# Patient Record
Sex: FEMALE | Race: WHITE | NOT HISPANIC OR LATINO | Employment: FULL TIME | URBAN - METROPOLITAN AREA
[De-identification: names, ages, dates, MRNs, and addresses within clinical notes are randomized per-mention and may not be internally consistent; named-entity substitution may affect disease eponyms.]

---

## 2022-01-31 ENCOUNTER — OFFICE VISIT (OUTPATIENT)
Dept: MEDICAL GROUP | Facility: PHYSICIAN GROUP | Age: 62
End: 2022-01-31
Payer: COMMERCIAL

## 2022-01-31 VITALS
RESPIRATION RATE: 16 BRPM | HEIGHT: 65 IN | WEIGHT: 161.3 LBS | HEART RATE: 65 BPM | BODY MASS INDEX: 26.87 KG/M2 | DIASTOLIC BLOOD PRESSURE: 76 MMHG | TEMPERATURE: 97.8 F | SYSTOLIC BLOOD PRESSURE: 124 MMHG | OXYGEN SATURATION: 96 %

## 2022-01-31 DIAGNOSIS — Z12.11 SPECIAL SCREENING FOR MALIGNANT NEOPLASM OF COLON: ICD-10-CM

## 2022-01-31 DIAGNOSIS — M79.644 CHRONIC THUMB PAIN, BILATERAL: ICD-10-CM

## 2022-01-31 DIAGNOSIS — G89.29 CHRONIC THUMB PAIN, BILATERAL: ICD-10-CM

## 2022-01-31 DIAGNOSIS — N81.4 UTERINE PROLAPSE: ICD-10-CM

## 2022-01-31 DIAGNOSIS — Z80.3 FAMILY HISTORY OF BREAST CANCER IN FIRST DEGREE RELATIVE: ICD-10-CM

## 2022-01-31 DIAGNOSIS — Z23 NEED FOR VACCINATION: ICD-10-CM

## 2022-01-31 DIAGNOSIS — Z98.84 STATUS POST BARIATRIC SURGERY: ICD-10-CM

## 2022-01-31 DIAGNOSIS — M79.645 CHRONIC THUMB PAIN, BILATERAL: ICD-10-CM

## 2022-01-31 PROCEDURE — 90472 IMMUNIZATION ADMIN EACH ADD: CPT | Performed by: NURSE PRACTITIONER

## 2022-01-31 PROCEDURE — 90471 IMMUNIZATION ADMIN: CPT | Performed by: NURSE PRACTITIONER

## 2022-01-31 PROCEDURE — 90732 PPSV23 VACC 2 YRS+ SUBQ/IM: CPT | Performed by: NURSE PRACTITIONER

## 2022-01-31 PROCEDURE — 99204 OFFICE O/P NEW MOD 45 MIN: CPT | Mod: 25 | Performed by: NURSE PRACTITIONER

## 2022-01-31 PROCEDURE — 90715 TDAP VACCINE 7 YRS/> IM: CPT | Performed by: NURSE PRACTITIONER

## 2022-01-31 ASSESSMENT — PATIENT HEALTH QUESTIONNAIRE - PHQ9: CLINICAL INTERPRETATION OF PHQ2 SCORE: 0

## 2022-01-31 NOTE — ASSESSMENT & PLAN NOTE
Chronic condition, stable.  Patient has done quite well after having bariatric surgery just over a year ago.  She has lost 90 pounds to date.  She does need follow-up labs in June 2022, which will put her approximately 18 months out from surgery at that time.  We will request records from her bariatric surgeon in [place], and she will return in 6 months with labs for follow-up.

## 2022-01-31 NOTE — ASSESSMENT & PLAN NOTE
Chronic condition, worsening.  Patient reports bilateral thumb pain right over the CMC joints.  She states that this has been present for some time, however over the past few months she has noticed that it is worsening.  We did discuss that she may have CMC joint arthritis, and referral to hand specialist for consideration of steroid injections may be beneficial to her.  Patient would like to do this.  Referral was placed to orthopedics, and patient will watch my chart for information on who she can call for appointment after approval.

## 2022-02-01 NOTE — ASSESSMENT & PLAN NOTE
Patient has history of breast cancer in her mother and grandmother.  She has been tested and is negative for the BRCA gene, however there is a variant of uncertain significance of MSH6 gene.  She also had a recent breast cyst on the left breast, which was biopsied and found to be negative for any cancer and positive for benign breast cyst.  Patient will be due for mammogram towards the end of this year.

## 2022-02-01 NOTE — ASSESSMENT & PLAN NOTE
Acute condition.  Patient reports that prior to her move from [place], she had consulted with her gynecologist who noted that she has a cystocele, rectocele and enterocele.  She does recommend surgical repair.  Patient would like to establish with gynecologic surgeon here for consultation.  She is having a difficult time at work, in which she does feel a lot of pain and pressure if she stands for too long without a 15 to 30-minute break to sit.  She does work for [employer], and I did tell her that if she wanted to provide me with reasonable work accommodation papers I would fill them out for her.  In the meanwhile, patient was advised that referral was placed, and to watch my chart for approval and where to call for appointment.

## 2022-02-01 NOTE — PROGRESS NOTES
Subjective:     CC: Diagnoses of Uterine prolapse, Chronic thumb pain, bilateral, Status post bariatric surgery, Family history of breast cancer in first degree relative, Special screening for malignant neoplasm of colon, and Need for vaccination were pertinent to this visit.      HPI:   Elaine is a new patient to me today wanting to establish care.  We discussed the following problems:    1. Uterine prolapse  Chronic condition, worsening.  Patient consulted for surgery in [place], would like referral to establish with surgeon in the [place] area.    2. Chronic thumb pain, bilateral  Chronic condition, worsening.  Patient here for evaluation today.    3. Status post bariatric surgery  Chronic condition, stable.  Patient due for updated labs in June 2022.    4. Family history of breast cancer in first degree relative  Family history of breast cancer, BRCA gene negative.    5. Special screening for malignant neoplasm of colon  Order placed for screening colonoscopy.    6. Need for vaccination  Pneumonia vaccine offered and given to patient today.       History reviewed. No pertinent past medical history.    Social History     Tobacco Use   • Smoking status: Never Smoker   • Smokeless tobacco: Never Used   Vaping Use   • Vaping Use: Never used   Substance Use Topics   • Alcohol use: Not Currently     Comment: Rare   • Drug use: Never       No current [facility]-ordered outpatient medications on file.     No current [facility]-ordered facility-administered medications on file.       Allergies:  Nsaids    Health Maintenance: Completed    ROS:  Gen: no fevers/chills, no changes in weight  Eyes: no changes in vision  ENT: no sore throat, no hearing loss, no bloody nose  Pulm: no sob, no cough  CV: no chest pain, no palpitations  GI: no nausea/vomiting, no diarrhea  : no dysuria  MSk: no myalgias  Skin: no rash  Neuro: no headaches, no numbness/tingling  Heme/Lymph: no easy bruising      Objective:       Exam:  /76   Pulse 65    "Temp 36.6 °C (97.8 °F)   Resp 16   Ht 1.65 m (5' 4.96\")   Wt 73.2 kg (161 lb 4.8 oz)   SpO2 96%   BMI 26.87 kg/m²  Body mass index is 26.87 kg/m².    Gen: Alert and oriented, No apparent distress.  Neck: Neck is supple without lymphadenopathy.  No carotid bruits.  Lungs: Normal effort, CTA bilaterally, no wheezes, rhonchi, or rales  CV: Regular rate and rhythm. No murmurs, rubs, or gallops.  Ext: No clubbing, cyanosis, edema.    Labs: None    Assessment & Plan:     71 y.o. female with the following -     Status post bariatric surgery  Chronic condition, stable.  Patient has done quite well after having bariatric surgery just over a year ago.  She has lost 90 pounds to date.  She does need follow-up labs in June 2022, which will put her approximately 18 months out from surgery at that time.  We will request records from her bariatric surgeon in [place], and she will return in 6 months with labs for follow-up.    Chronic thumb pain, bilateral  Chronic condition, worsening.  Patient reports bilateral thumb pain right over the CMC joints.  She states that this has been present for some time, however over the past few months she has noticed that it is worsening.  We did discuss that she may have CMC joint arthritis, and referral to hand specialist for consideration of steroid injections may be beneficial to her.  Patient would like to do this.  Referral was placed to orthopedics, and patient will watch my chart for information on who she can call for appointment after approval.    Uterine prolapse  Acute condition.  Patient reports that prior to her move from [place], she had consulted with her gynecologist who noted that she has a cystocele, rectocele and enterocele.  She does recommend surgical repair.  Patient would like to establish with gynecologic surgeon here for consultation.  She is having a difficult time at work, in which she does feel a lot of pain and pressure if she stands for too long without a 15 to " 30-minute break to sit.  She does work for [employer], and I did tell her that if she wanted to provide me with reasonable work accommodation papers I would fill them out for her.  In the meanwhile, patient was advised that referral was placed, and to watch my chart for approval and where to call for appointment.    Family history of breast cancer in first degree relative  Patient has history of breast cancer in her mother and grandmother.  She has been tested and is negative for the BRCA gene, however there is a variant of uncertain significance of MSH6 gene.  She also had a recent breast cyst on the left breast, which was biopsied and found to be negative for any cancer and positive for benign breast cyst.  Patient will be due for mammogram towards the end of this year.      Orders Placed This Encounter   • Tdap Vaccine =>6YO IM   • Pneumovax Vaccine (PPSV23)   • Referral to OB/Gyn Surgery   • Referral to Orthopedics   • Referral to GI for Colonoscopy          Return in about 6 months (around 7/31/2022).    I have placed the below orders and discussed them with an approved delegating provider.  The MA is performing the below orders under the direction of Eva Kovacs MD.    Please note that this dictation was created using voice recognition software. I have made every reasonable attempt to correct obvious errors, but I expect that there are errors of grammar and possibly content that I did not discover before finalizing the note.

## 2022-02-03 ENCOUNTER — PATIENT MESSAGE (OUTPATIENT)
Dept: MEDICAL GROUP | Facility: PHYSICIAN GROUP | Age: 62
End: 2022-02-03

## 2022-02-10 ENCOUNTER — PATIENT MESSAGE (OUTPATIENT)
Dept: MEDICAL GROUP | Facility: PHYSICIAN GROUP | Age: 62
End: 2022-02-10
Payer: COMMERCIAL

## 2022-02-10 ENCOUNTER — PATIENT MESSAGE (OUTPATIENT)
Dept: MEDICAL GROUP | Facility: PHYSICIAN GROUP | Age: 62
End: 2022-02-10

## 2022-03-01 ENCOUNTER — PATIENT MESSAGE (OUTPATIENT)
Dept: MEDICAL GROUP | Facility: PHYSICIAN GROUP | Age: 62
End: 2022-03-01
Payer: COMMERCIAL

## 2022-03-01 ENCOUNTER — TELEPHONE (OUTPATIENT)
Dept: MEDICAL GROUP | Facility: PHYSICIAN GROUP | Age: 62
End: 2022-03-01
Payer: COMMERCIAL

## 2022-03-01 NOTE — LETTER
[facility]  ROHIT Yousif  [address]  [facility] [address]  Fax: [phone]   Authorization for Release/Disclosure of   Protected Health Information   Name: ZOE ELIAS : 1960 SSN: [id number]   Address: [facility] [address]  [facility] [address] Phone:    [phone] (home)    I authorize the entity listed below to release/disclose the PHI below to:   [facility]/ROHIT Yousif and ROHIT Yousif   Provider or Entity Name:  NICOLE BARNES M.D.      Address   City, State, Zip   Phone:      Fax:  [phone]        Reason for request: continuity of care   Information to be released:    [  ] LAST COLONOSCOPY,  including any PATH REPORT and follow-up  [  ] LAST FIT/COLOGUARD RESULT [  ] LAST DEXA  [  ] LAST MAMMOGRAM  [  ] LAST PAP  [  ] LAST LABS [  ] RETINA EXAM REPORT  [  ] IMMUNIZATION RECORDS  [  ] Release all info      [  ] Check here and initial the line next to each item to release ALL health information INCLUDING  _____ Care and treatment for drug and / or alcohol abuse  _____ HIV testing, infection status, or AIDS  _____ Genetic Testing    DATES OF SERVICE OR TIME PERIOD TO BE DISCLOSED: _____________  I understand and acknowledge that:  * This Authorization may be revoked at any time by you in writing, except if your health information has already been used or disclosed.  * Your health information that will be used or disclosed as a result of you signing this authorization could be re-disclosed by the recipient. If this occurs, your re-disclosed health information may no longer be protected by State or Federal laws.  * You may refuse to sign this Authorization. Your refusal will not affect your ability to obtain treatment.  * This Authorization becomes effective upon signing and will  on (date) __________.      If no date is indicated, this Authorization will  one (1) year from the signature date.    Name: Zoe Elias    Signature:Continuity of  care   Date:     3/2/2022       PLEASE FAX REQUESTED RECORDS BACK TO: [phone]

## 2022-03-02 NOTE — TELEPHONE ENCOUNTER
Referral ordered, faxed[facility]

## 2022-06-22 ENCOUNTER — OFFICE VISIT (OUTPATIENT)
Dept: MEDICAL GROUP | Facility: PHYSICIAN GROUP | Age: 62
End: 2022-06-22
Payer: COMMERCIAL

## 2022-06-22 VITALS
TEMPERATURE: 97.6 F | OXYGEN SATURATION: 95 % | HEART RATE: 87 BPM | WEIGHT: 160.6 LBS | SYSTOLIC BLOOD PRESSURE: 102 MMHG | BODY MASS INDEX: 26.76 KG/M2 | HEIGHT: 65 IN | DIASTOLIC BLOOD PRESSURE: 60 MMHG | RESPIRATION RATE: 16 BRPM

## 2022-06-22 DIAGNOSIS — G89.29 CHRONIC THUMB PAIN, BILATERAL: Primary | ICD-10-CM

## 2022-06-22 DIAGNOSIS — N81.4 UTERINE PROLAPSE: ICD-10-CM

## 2022-06-22 DIAGNOSIS — M79.645 CHRONIC THUMB PAIN, BILATERAL: Primary | ICD-10-CM

## 2022-06-22 DIAGNOSIS — M79.644 CHRONIC THUMB PAIN, BILATERAL: Primary | ICD-10-CM

## 2022-06-22 DIAGNOSIS — Z98.84 STATUS POST BARIATRIC SURGERY: ICD-10-CM

## 2022-06-22 PROCEDURE — 99214 OFFICE O/P EST MOD 30 MIN: CPT | Performed by: NURSE PRACTITIONER

## 2022-06-22 SDOH — ECONOMIC STABILITY: TRANSPORTATION INSECURITY
IN THE PAST 12 MONTHS, HAS LACK OF RELIABLE TRANSPORTATION KEPT YOU FROM MEDICAL APPOINTMENTS, MEETINGS, WORK OR FROM GETTING THINGS NEEDED FOR DAILY LIVING?: NO

## 2022-06-22 SDOH — HEALTH STABILITY: PHYSICAL HEALTH: ON AVERAGE, HOW MANY MINUTES DO YOU ENGAGE IN EXERCISE AT THIS LEVEL?: 60 MIN

## 2022-06-22 SDOH — ECONOMIC STABILITY: HOUSING INSECURITY: IN THE LAST 12 MONTHS, HOW MANY PLACES HAVE YOU LIVED?: 1

## 2022-06-22 SDOH — ECONOMIC STABILITY: FOOD INSECURITY: WITHIN THE PAST 12 MONTHS, YOU WORRIED THAT YOUR FOOD WOULD RUN OUT BEFORE YOU GOT MONEY TO BUY MORE.: NEVER TRUE

## 2022-06-22 SDOH — ECONOMIC STABILITY: FOOD INSECURITY: WITHIN THE PAST 12 MONTHS, THE FOOD YOU BOUGHT JUST DIDN'T LAST AND YOU DIDN'T HAVE MONEY TO GET MORE.: NEVER TRUE

## 2022-06-22 SDOH — ECONOMIC STABILITY: TRANSPORTATION INSECURITY
IN THE PAST 12 MONTHS, HAS LACK OF TRANSPORTATION KEPT YOU FROM MEETINGS, WORK, OR FROM GETTING THINGS NEEDED FOR DAILY LIVING?: NO

## 2022-06-22 SDOH — ECONOMIC STABILITY: HOUSING INSECURITY
IN THE LAST 12 MONTHS, WAS THERE A TIME WHEN YOU DID NOT HAVE A STEADY PLACE TO SLEEP OR SLEPT IN A SHELTER (INCLUDING NOW)?: NO

## 2022-06-22 SDOH — HEALTH STABILITY: PHYSICAL HEALTH: ON AVERAGE, HOW MANY DAYS PER WEEK DO YOU ENGAGE IN MODERATE TO STRENUOUS EXERCISE (LIKE A BRISK WALK)?: 5 DAYS

## 2022-06-22 SDOH — HEALTH STABILITY: MENTAL HEALTH
STRESS IS WHEN SOMEONE FEELS TENSE, NERVOUS, ANXIOUS, OR CAN'T SLEEP AT NIGHT BECAUSE THEIR MIND IS TROUBLED. HOW STRESSED ARE YOU?: NOT AT ALL

## 2022-06-22 SDOH — ECONOMIC STABILITY: INCOME INSECURITY: HOW HARD IS IT FOR YOU TO PAY FOR THE VERY BASICS LIKE FOOD, HOUSING, MEDICAL CARE, AND HEATING?: NOT VERY HARD

## 2022-06-22 SDOH — ECONOMIC STABILITY: INCOME INSECURITY: IN THE LAST 12 MONTHS, WAS THERE A TIME WHEN YOU WERE NOT ABLE TO PAY THE MORTGAGE OR RENT ON TIME?: NO

## 2022-06-22 SDOH — ECONOMIC STABILITY: TRANSPORTATION INSECURITY
IN THE PAST 12 MONTHS, HAS THE LACK OF TRANSPORTATION KEPT YOU FROM MEDICAL APPOINTMENTS OR FROM GETTING MEDICATIONS?: NO

## 2022-06-22 ASSESSMENT — SOCIAL DETERMINANTS OF HEALTH (SDOH)
HOW OFTEN DO YOU HAVE SIX OR MORE DRINKS ON ONE OCCASION: NEVER
WITHIN THE PAST 12 MONTHS, YOU WORRIED THAT YOUR FOOD WOULD RUN OUT BEFORE YOU GOT THE MONEY TO BUY MORE: NEVER TRUE
HOW OFTEN DO YOU HAVE A DRINK CONTAINING ALCOHOL: NEVER
HOW OFTEN DO YOU ATTENT MEETINGS OF THE CLUB OR ORGANIZATION YOU BELONG TO?: NEVER
HOW OFTEN DO YOU ATTEND CHURCH OR RELIGIOUS SERVICES?: 1 TO 4 TIMES PER YEAR
IN A TYPICAL WEEK, HOW MANY TIMES DO YOU TALK ON THE PHONE WITH FAMILY, FRIENDS, OR NEIGHBORS?: ONCE A WEEK
HOW MANY DRINKS CONTAINING ALCOHOL DO YOU HAVE ON A TYPICAL DAY WHEN YOU ARE DRINKING: PATIENT DOES NOT DRINK
DO YOU BELONG TO ANY CLUBS OR ORGANIZATIONS SUCH AS CHURCH GROUPS UNIONS, FRATERNAL OR ATHLETIC GROUPS, OR SCHOOL GROUPS?: NO
IN A TYPICAL WEEK, HOW MANY TIMES DO YOU TALK ON THE PHONE WITH FAMILY, FRIENDS, OR NEIGHBORS?: ONCE A WEEK
HOW OFTEN DO YOU GET TOGETHER WITH FRIENDS OR RELATIVES?: ONCE A WEEK
DO YOU BELONG TO ANY CLUBS OR ORGANIZATIONS SUCH AS CHURCH GROUPS UNIONS, FRATERNAL OR ATHLETIC GROUPS, OR SCHOOL GROUPS?: NO
HOW OFTEN DO YOU GET TOGETHER WITH FRIENDS OR RELATIVES?: ONCE A WEEK
HOW OFTEN DO YOU ATTENT MEETINGS OF THE CLUB OR ORGANIZATION YOU BELONG TO?: NEVER
HOW HARD IS IT FOR YOU TO PAY FOR THE VERY BASICS LIKE FOOD, HOUSING, MEDICAL CARE, AND HEATING?: NOT VERY HARD
HOW OFTEN DO YOU ATTEND CHURCH OR RELIGIOUS SERVICES?: 1 TO 4 TIMES PER YEAR

## 2022-06-22 ASSESSMENT — LIFESTYLE VARIABLES
SKIP TO QUESTIONS 9-10: 1
HOW OFTEN DO YOU HAVE SIX OR MORE DRINKS ON ONE OCCASION: NEVER
AUDIT-C TOTAL SCORE: 0
HOW MANY STANDARD DRINKS CONTAINING ALCOHOL DO YOU HAVE ON A TYPICAL DAY: PATIENT DOES NOT DRINK
HOW OFTEN DO YOU HAVE A DRINK CONTAINING ALCOHOL: NEVER

## 2022-06-22 NOTE — ASSESSMENT & PLAN NOTE
Chronic and ongoing. Patient reports improvement in her thumb pain.  She bought wrist braces and has been wearing them for about one and a half weeks which has really made a difference.  She also did a little bit of therapy with a therapist that is provided at work.  She states improvement with this also.  She will continue with her conservative management and let me know if she needs anything further.

## 2022-06-22 NOTE — ASSESSMENT & PLAN NOTE
Chronic and ongoing.  Patient has consulted with GYN and cannot take time off work for surgical repair until she has been at her job for one year, which will be January 28, 2023.  She is scheduled for a pessary on 7/5.  She is doing okay, and is able to work full-time with the work accomodations given to her. Those are in place until 2/2023.  Patient will let me know if she needs any additional accomodation paperwork while she waits for a surgical date.

## 2022-06-23 NOTE — ASSESSMENT & PLAN NOTE
Chronic condition, stable.  Patient is s/p bariatric surgery. She is doing well.  Weight is stable at 160.  She does need her annual lab follow up for this. Orders were placed today.

## 2022-06-23 NOTE — PROGRESS NOTES
"Subjective:     CC: The primary encounter diagnosis was Chronic thumb pain, bilateral. Diagnoses of Uterine prolapse and Status post bariatric surgery were also pertinent to this visit.      HPI:   Elaine is an established patient of [facility] here for follow-up.  We discussed the following problems:    1. Chronic thumb pain, bilateral  Chronic condition, stable. Patient is here for evaluation today.    2. Uterine prolapse  Chronic condition, stable. Patient is here for evaluation today.    3. Status post bariatric surgery  Chronic condition, stable. Patient is here for evaluation today.     History reviewed. No pertinent past medical history.    Social History     Tobacco Use   • Smoking status: Never Smoker   • Smokeless tobacco: Never Used   Vaping Use   • Vaping Use: Never used   Substance Use Topics   • Alcohol use: Not Currently     Comment: Rare   • Drug use: Never       No current Epic-ordered outpatient medications on file.     No current [facility]-ordered facility-administered medications on file.       Allergies:  Nsaids    Health Maintenance: Completed    ROS:  Gen: no fevers/chills, no changes in weight  Eyes: no changes in vision  ENT: no sore throat, no hearing loss, no bloody nose  Pulm: no sob, no cough  CV: no chest pain, no palpitations  GI: no nausea/vomiting, no diarrhea  : no dysuria  MSk: no myalgias  Skin: no rash  Neuro: no headaches, no numbness/tingling  Heme/Lymph: no easy bruising      Objective:       Exam:  /60 (BP Location: Right arm, Patient Position: Sitting, BP Cuff Size: Adult)   Pulse 87   Temp 36.4 °C (97.6 °F) (Temporal)   Resp 16   Ht 1.65 m (5' 4.96\")   Wt 72.8 kg (160 lb 9.6 oz)   SpO2 95%   BMI 26.76 kg/m²  Body mass index is 26.76 kg/m².    Gen: Alert and oriented, No apparent distress.  Neck: Neck is supple without lymphadenopathy.  No carotid bruits.  Lungs: Normal effort, CTA bilaterally, no wheezes, rhonchi, or rales  CV: Regular rate and rhythm. No murmurs, rubs, " or gallops.  Ext: No clubbing, cyanosis, edema.    Labs: Dated: None    Assessment & Plan:     62 y.o. female with the following -     Chronic thumb pain, bilateral  Chronic and ongoing. Patient reports improvement in her thumb pain.  She bought wrist braces and has been wearing them for about one and a half weeks which has really made a difference.  She also did a little bit of therapy with a therapist that is provided at work.  She states improvement with this also.  She will continue with her conservative management and let me know if she needs anything further.     Uterine prolapse  Chronic and ongoing.  Patient has consulted with GYN and cannot take time off work for surgical repair until she has been at her job for one year, which will be January 28, 2023.  She is scheduled for a pessary on 7/5.  She is doing okay, and is able to work full-time with the work accomodations given to her. Those are in place until 2/2023.  Patient will let me know if she needs any additional accomodation paperwork while she waits for a surgical date.     Status post bariatric surgery  Chronic condition, stable.  Patient is s/p bariatric surgery. She is doing well.  Weight is stable at 160.  She does need her annual lab follow up for this. Orders were placed today.      Orders Placed This Encounter   • CBC WITH DIFFERENTIAL   • Comp Metabolic Panel   • COPPER, SERUM   • FERRITIN   • FOLATE   • IRON/TOTAL IRON BIND   • VITAMIN A   • ZINC SERUM   • VITAMIN K   • VITAMIN E   • VITAMIN D,25 HYDROXY   • VITAMIN B12   • VITAMIN B1   • Lipid Profile          Return in about 1 year (around 6/22/2023), or if symptoms worsen or fail to improve.    I have placed the below orders and discussed them with an approved delegating provider.  The MA is performing the below orders under the direction of Eva Kovacs MD.    Please note that this dictation was created using voice recognition software. I have made every reasonable attempt to correct  obvious errors, but I expect that there are errors of grammar and possibly content that I did not discover before finalizing the note.

## 2022-07-05 ENCOUNTER — HOSPITAL ENCOUNTER (OUTPATIENT)
Dept: LAB | Facility: MEDICAL CENTER | Age: 62
End: 2022-07-05
Attending: NURSE PRACTITIONER
Payer: COMMERCIAL

## 2022-07-05 DIAGNOSIS — Z98.84 STATUS POST BARIATRIC SURGERY: ICD-10-CM

## 2022-07-05 LAB
25(OH)D3 SERPL-MCNC: 38 NG/ML (ref 30–100)
ALBUMIN SERPL BCP-MCNC: 3.8 G/DL (ref 3.2–4.9)
ALBUMIN/GLOB SERPL: 1.8 G/DL
ALP SERPL-CCNC: 55 U/L (ref 30–99)
ALT SERPL-CCNC: 24 U/L (ref 2–50)
ANION GAP SERPL CALC-SCNC: 8 MMOL/L (ref 7–16)
AST SERPL-CCNC: 26 U/L (ref 12–45)
BASOPHILS # BLD AUTO: 0.5 % (ref 0–1.8)
BASOPHILS # BLD: 0.02 K/UL (ref 0–0.12)
BILIRUB SERPL-MCNC: 0.5 MG/DL (ref 0.1–1.5)
BUN SERPL-MCNC: 16 MG/DL (ref 8–22)
CALCIUM SERPL-MCNC: 8.7 MG/DL (ref 8.5–10.5)
CHLORIDE SERPL-SCNC: 107 MMOL/L (ref 96–112)
CHOLEST SERPL-MCNC: 224 MG/DL (ref 100–199)
CO2 SERPL-SCNC: 26 MMOL/L (ref 20–33)
CREAT SERPL-MCNC: 0.72 MG/DL (ref 0.5–1.4)
EOSINOPHIL # BLD AUTO: 0.08 K/UL (ref 0–0.51)
EOSINOPHIL NFR BLD: 1.9 % (ref 0–6.9)
ERYTHROCYTE [DISTWIDTH] IN BLOOD BY AUTOMATED COUNT: 42.8 FL (ref 35.9–50)
FASTING STATUS PATIENT QL REPORTED: NORMAL
FERRITIN SERPL-MCNC: 143 NG/ML (ref 10–291)
FOLATE SERPL-MCNC: 23.6 NG/ML
GFR SERPLBLD CREATININE-BSD FMLA CKD-EPI: 96 ML/MIN/1.73 M 2
GLOBULIN SER CALC-MCNC: 2.1 G/DL (ref 1.9–3.5)
GLUCOSE SERPL-MCNC: 85 MG/DL (ref 65–99)
HCT VFR BLD AUTO: 44.9 % (ref 37–47)
HDLC SERPL-MCNC: 60 MG/DL
HGB BLD-MCNC: 15.2 G/DL (ref 12–16)
IMM GRANULOCYTES # BLD AUTO: 0.01 K/UL (ref 0–0.11)
IMM GRANULOCYTES NFR BLD AUTO: 0.2 % (ref 0–0.9)
IRON SATN MFR SERPL: 40 % (ref 15–55)
IRON SERPL-MCNC: 104 UG/DL (ref 40–170)
LDLC SERPL CALC-MCNC: 149 MG/DL
LYMPHOCYTES # BLD AUTO: 1.34 K/UL (ref 1–4.8)
LYMPHOCYTES NFR BLD: 32.5 % (ref 22–41)
MCH RBC QN AUTO: 29.4 PG (ref 27–33)
MCHC RBC AUTO-ENTMCNC: 33.9 G/DL (ref 33.6–35)
MCV RBC AUTO: 86.8 FL (ref 81.4–97.8)
MONOCYTES # BLD AUTO: 0.26 K/UL (ref 0–0.85)
MONOCYTES NFR BLD AUTO: 6.3 % (ref 0–13.4)
NEUTROPHILS # BLD AUTO: 2.41 K/UL (ref 2–7.15)
NEUTROPHILS NFR BLD: 58.6 % (ref 44–72)
NRBC # BLD AUTO: 0 K/UL
NRBC BLD-RTO: 0 /100 WBC
PLATELET # BLD AUTO: 203 K/UL (ref 164–446)
PMV BLD AUTO: 9.8 FL (ref 9–12.9)
POTASSIUM SERPL-SCNC: 4.2 MMOL/L (ref 3.6–5.5)
PROT SERPL-MCNC: 5.9 G/DL (ref 6–8.2)
RBC # BLD AUTO: 5.17 M/UL (ref 4.2–5.4)
SODIUM SERPL-SCNC: 141 MMOL/L (ref 135–145)
TIBC SERPL-MCNC: 258 UG/DL (ref 250–450)
TRIGL SERPL-MCNC: 77 MG/DL (ref 0–149)
UIBC SERPL-MCNC: 154 UG/DL (ref 110–370)
VIT B12 SERPL-MCNC: 2670 PG/ML (ref 211–911)
WBC # BLD AUTO: 4.1 K/UL (ref 4.8–10.8)

## 2022-07-05 PROCEDURE — 85025 COMPLETE CBC W/AUTO DIFF WBC: CPT

## 2022-07-05 PROCEDURE — 84630 ASSAY OF ZINC: CPT

## 2022-07-05 PROCEDURE — 84590 ASSAY OF VITAMIN A: CPT

## 2022-07-05 PROCEDURE — 80053 COMPREHEN METABOLIC PANEL: CPT

## 2022-07-05 PROCEDURE — 82728 ASSAY OF FERRITIN: CPT

## 2022-07-05 PROCEDURE — 83550 IRON BINDING TEST: CPT

## 2022-07-05 PROCEDURE — 82746 ASSAY OF FOLIC ACID SERUM: CPT

## 2022-07-05 PROCEDURE — 82607 VITAMIN B-12: CPT

## 2022-07-05 PROCEDURE — 36415 COLL VENOUS BLD VENIPUNCTURE: CPT

## 2022-07-05 PROCEDURE — 80061 LIPID PANEL: CPT

## 2022-07-05 PROCEDURE — 84425 ASSAY OF VITAMIN B-1: CPT

## 2022-07-05 PROCEDURE — 84597 ASSAY OF VITAMIN K: CPT

## 2022-07-05 PROCEDURE — 83540 ASSAY OF IRON: CPT

## 2022-07-05 PROCEDURE — 84446 ASSAY OF VITAMIN E: CPT

## 2022-07-05 PROCEDURE — 82525 ASSAY OF COPPER: CPT

## 2022-07-05 PROCEDURE — 82306 VITAMIN D 25 HYDROXY: CPT

## 2022-07-07 LAB — VIT B1 BLD-MCNC: 153 NMOL/L (ref 70–180)

## 2022-07-08 LAB
A-TOCOPHEROL VIT E SERPL-MCNC: 15.1 MG/L (ref 5.5–18)
ANNOTATION COMMENT IMP: NORMAL
BETA+GAMMA TOCOPHEROL SERPL-MCNC: 0.9 MG/L (ref 0–6)
COPPER SERPL-MCNC: 97.9 UG/DL (ref 80–155)
RETINYL PALMITATE SERPL-MCNC: 0.03 MG/L (ref 0–0.1)
VIT A SERPL-MCNC: 1.01 MG/L (ref 0.3–1.2)
ZINC SERPL-MCNC: 83.4 UG/DL (ref 60–120)

## 2022-07-09 LAB — PHYTONADIONE SERPL-MCNC: 2.19 NMOL/L (ref 0.22–4.88)

## 2022-09-26 ENCOUNTER — HOSPITAL ENCOUNTER (OUTPATIENT)
Dept: RADIOLOGY | Facility: MEDICAL CENTER | Age: 62
End: 2022-09-26
Attending: NURSE PRACTITIONER
Payer: COMMERCIAL

## 2022-09-26 DIAGNOSIS — Z12.31 VISIT FOR SCREENING MAMMOGRAM: ICD-10-CM

## 2022-09-26 PROCEDURE — 77063 BREAST TOMOSYNTHESIS BI: CPT

## 2022-09-30 ENCOUNTER — HOSPITAL ENCOUNTER (OUTPATIENT)
Dept: RADIOLOGY | Facility: MEDICAL CENTER | Age: 62
End: 2022-09-30
Payer: COMMERCIAL

## 2022-12-15 ENCOUNTER — OFFICE VISIT (OUTPATIENT)
Dept: URGENT CARE | Facility: CLINIC | Age: 62
End: 2022-12-15
Payer: COMMERCIAL

## 2022-12-15 VITALS
TEMPERATURE: 97.5 F | OXYGEN SATURATION: 99 % | WEIGHT: 170 LBS | HEIGHT: 65 IN | SYSTOLIC BLOOD PRESSURE: 128 MMHG | DIASTOLIC BLOOD PRESSURE: 78 MMHG | HEART RATE: 79 BPM | BODY MASS INDEX: 28.32 KG/M2 | RESPIRATION RATE: 18 BRPM

## 2022-12-15 DIAGNOSIS — J06.9 URI WITH COUGH AND CONGESTION: ICD-10-CM

## 2022-12-15 LAB
EXTERNAL QUALITY CONTROL: NORMAL
FLUAV+FLUBV AG SPEC QL IA: NEGATIVE
INT CON NEG: NEGATIVE
INT CON NEG: NEGATIVE
INT CON POS: POSITIVE
INT CON POS: POSITIVE
SARS-COV+SARS-COV-2 AG RESP QL IA.RAPID: NEGATIVE

## 2022-12-15 PROCEDURE — 87426 SARSCOV CORONAVIRUS AG IA: CPT

## 2022-12-15 PROCEDURE — 99213 OFFICE O/P EST LOW 20 MIN: CPT

## 2022-12-15 PROCEDURE — 87804 INFLUENZA ASSAY W/OPTIC: CPT

## 2022-12-15 ASSESSMENT — FIBROSIS 4 INDEX: FIB4 SCORE: 1.57

## 2022-12-15 ASSESSMENT — ENCOUNTER SYMPTOMS
WEIGHT LOSS: 0
CHILLS: 0
WHEEZING: 0
SORE THROAT: 0
FEVER: 0
DIAPHORESIS: 0
SPUTUM PRODUCTION: 1
HEMOPTYSIS: 0
COUGH: 1
SHORTNESS OF BREATH: 0
SINUS PAIN: 0

## 2022-12-15 NOTE — LETTER
December 15, 2022    To Whom It May Concern:         This is confirmation that Elaine Darren attended her scheduled appointment with ROHIT Ndiaye on 12/15/22. Please excuse her from work 12/15/22-12/16/22.         If you have any questions please do not hesitate to call me at the phone number listed below.        Sincerely,        ROHIT Ndiaye   Electronically signed  [phone]

## 2022-12-16 NOTE — PROGRESS NOTES
Subjective:   Elaine Banks is a 62 y.o. female who presents for Cough (X3days Chest congestion/green/white phlegm/)      HPI: This is a 8-year-old female presents today for cough and congestion.  This new problem.  Patient reports cough and congestion x3 days.  She reports cough is intermittently productive.  She has taken Mucinex for this.  She recently has had sick contacts.  She denies fevers and chills.  She has had mild body aches.  She denies shortness of breath or wheezing.  No smoking history, asthma, COPD.  She is fully vaccinated for COVID and has recently had influenza vaccination.      Review of Systems   Constitutional:  Positive for malaise/fatigue. Negative for chills, diaphoresis, fever and weight loss.   HENT:  Positive for congestion. Negative for ear pain, sinus pain and sore throat.    Respiratory:  Positive for cough and sputum production. Negative for hemoptysis, shortness of breath and wheezing.      Medications:    No current outpatient medications on file prior to visit.     No current facility-administered medications on file prior to visit.        Allergies:   Nsaids    Problem List:   Patient Active Problem List   Diagnosis    Status post bariatric surgery    Uterine prolapse    Chronic thumb pain, bilateral    Family history of breast cancer in first degree relative        Surgical History:  Past Surgical History:   Procedure Laterality Date    OTHER ABDOMINAL SURGERY  12/2020    gastric sleeve surgery    BUNIONECTOMY Right 2018    LIPOMA EXCISION Left 2016    ABDOMINAL EXPLORATION         Past Social Hx:   Social History     Tobacco Use    Smoking status: Never    Smokeless tobacco: Never   Vaping Use    Vaping Use: Never used   Substance Use Topics    Alcohol use: Not Currently     Comment: Rare    Drug use: Never          Problem list, medications, and allergies reviewed by myself today in Epic.     Objective:     /78 (BP Location: Right arm, Patient Position: Sitting)    "Pulse 79   Temp 36.4 °C (97.5 °F) (Temporal)   Resp 18   Ht 1.651 m (5' 5\")   Wt 77.1 kg (170 lb)   SpO2 99%   BMI 28.29 kg/m²     Physical Exam  Vitals and nursing note reviewed.   Constitutional:       General: She is not in acute distress.     Appearance: Normal appearance. She is normal weight. She is not ill-appearing, toxic-appearing or diaphoretic.   HENT:      Head: Normocephalic and atraumatic.      Right Ear: Tympanic membrane, ear canal and external ear normal. There is no impacted cerumen.      Left Ear: Tympanic membrane, ear canal and external ear normal. There is no impacted cerumen.      Nose: Nose normal. No congestion or rhinorrhea.      Mouth/Throat:      Mouth: Mucous membranes are moist.      Pharynx: Oropharynx is clear. No oropharyngeal exudate or posterior oropharyngeal erythema.   Cardiovascular:      Rate and Rhythm: Normal rate and regular rhythm.      Pulses: Normal pulses.      Heart sounds: Normal heart sounds. No murmur heard.    No friction rub. No gallop.   Pulmonary:      Effort: Pulmonary effort is normal. No respiratory distress.      Breath sounds: Normal breath sounds. No stridor. No wheezing, rhonchi or rales.      Comments: +cough    Chest:      Chest wall: No tenderness.   Musculoskeletal:      Cervical back: Neck supple. No tenderness.   Lymphadenopathy:      Cervical: No cervical adenopathy.   Skin:     General: Skin is warm and dry.      Capillary Refill: Capillary refill takes less than 2 seconds.   Neurological:      General: No focal deficit present.      Mental Status: She is alert and oriented to person, place, and time. Mental status is at baseline.      Cranial Nerves: No cranial nerve deficit.      Motor: No weakness.      Gait: Gait normal.   Psychiatric:         Mood and Affect: Mood normal.         Behavior: Behavior normal.         Thought Content: Thought content normal.         Judgment: Judgment normal.       Assessment/Plan:     Diagnosis and " associated orders:   1. URI with cough and congestion  POCT Influenza A/B    POCT SARS-COV Antigen [facility] Manual Result          Comments/MDM:   Pt is clinically stable at today's acute urgent care visit.  No acute distress noted. Appropriate for outpatient management at this time.     Acute problem.  Patient is not ill or toxic appearing clinic today.  Vital signs are stable, SPO2 99% on room air, lung sounds clear to auscultation, nonlabored breathing.  Rapid influenza and COVID both negative in clinic today.I have discussed with patient that symptoms are viral in etiology. I have recommended supportive care to include; Increased fluids, rest, over-the-counter cold and flu medications, alternating Tylenol and/or ibuprofen per manufactures instructions for symptomatic relief and fevers, and the use of a humidifier. Patient is to return to UC or go to ER for any new or worsening s/s, and follow up with PCP for recheck. Patient is agreeable with plan of care and verbalized good understanding.   Work note provided.         Discussed DDx, management options (risks,benefits, and alternatives to planned treatment), natural progression and supportive care.  Expressed understanding and the treatment plan was agreed upon. Questions were encouraged and answered   Return to urgent care prn if new or worsening sx or if there is no improvement in condition prn.    Educated in Red flags and indications to immediately call 911 or present to the Emergency Department.   Advised the patient to follow-up with the primary care physician for recheck, reevaluation, and consideration of further management.    I personally reviewed prior external notes and test results pertinent to today's visit.  I have independently reviewed and interpreted all diagnostics ordered during this urgent care acute visit.       Please note that this dictation was created using voice recognition software. I have made a reasonable attempt to correct obvious  errors, but I expect that there are errors of grammar and possibly content that I did not discover before finalizing the note.    This note was electronically signed by FAHAD Bazan

## 2022-12-20 ENCOUNTER — OFFICE VISIT (OUTPATIENT)
Dept: URGENT CARE | Facility: CLINIC | Age: 62
End: 2022-12-20
Payer: COMMERCIAL

## 2022-12-20 VITALS
TEMPERATURE: 99.2 F | OXYGEN SATURATION: 97 % | WEIGHT: 170 LBS | RESPIRATION RATE: 20 BRPM | SYSTOLIC BLOOD PRESSURE: 122 MMHG | BODY MASS INDEX: 28.32 KG/M2 | DIASTOLIC BLOOD PRESSURE: 78 MMHG | HEART RATE: 70 BPM | HEIGHT: 65 IN

## 2022-12-20 DIAGNOSIS — J32.9 SINUSITIS, UNSPECIFIED CHRONICITY, UNSPECIFIED LOCATION: ICD-10-CM

## 2022-12-20 DIAGNOSIS — J34.89 SINUS PRESSURE: ICD-10-CM

## 2022-12-20 PROCEDURE — 99213 OFFICE O/P EST LOW 20 MIN: CPT | Performed by: FAMILY MEDICINE

## 2022-12-20 RX ORDER — ACETAMINOPHEN 500 MG
500-1000 TABLET ORAL EVERY 6 HOURS PRN
COMMUNITY

## 2022-12-20 RX ORDER — AMOXICILLIN AND CLAVULANATE POTASSIUM 875; 125 MG/1; MG/1
1 TABLET, FILM COATED ORAL 2 TIMES DAILY
Qty: 14 TABLET | Refills: 0 | Status: SHIPPED | OUTPATIENT
Start: 2022-12-20 | End: 2022-12-27

## 2022-12-20 ASSESSMENT — ENCOUNTER SYMPTOMS
SORE THROAT: 0
FEVER: 1
COUGH: 0
VOMITING: 0
SHORTNESS OF BREATH: 0
SINUS PAIN: 1
NAUSEA: 0
CHILLS: 0
DIZZINESS: 0
MYALGIAS: 1

## 2022-12-20 ASSESSMENT — FIBROSIS 4 INDEX: FIB4 SCORE: 1.57

## 2022-12-20 NOTE — PATIENT INSTRUCTIONS
Sinusitis, Adult  Sinusitis is soreness and swelling (inflammation) of your sinuses. Sinuses are hollow spaces in the bones around your face. They are located:  Around your eyes.  In the middle of your forehead.  Behind your nose.  In your cheekbones.  Your sinuses and nasal passages are lined with a fluid called mucus. Mucus drains out of your sinuses. Swelling can trap mucus in your sinuses. This lets germs (bacteria, virus, or fungus) grow, which leads to infection. Most of the time, this condition is caused by a virus.  What are the causes?  This condition is caused by:  Allergies.  Asthma.  Germs.  Things that block your nose or sinuses.  Growths in the nose (nasal polyps).  Chemicals or irritants in the air.  Fungus (rare).  What increases the risk?  You are more likely to develop this condition if:  You have a weak body defense system (immune system).  You do a lot of swimming or diving.  You use nasal sprays too much.  You smoke.  What are the signs or symptoms?  The main symptoms of this condition are pain and a feeling of pressure around the sinuses. Other symptoms include:  Stuffy nose (congestion).  Runny nose (drainage).  Swelling and warmth in the sinuses.  Headache.  Toothache.  A cough that may get worse at night.  Mucus that collects in the throat or the back of the nose (postnasal drip).  Being unable to smell and taste.  Being very tired (fatigue).  A fever.  Sore throat.  Bad breath.  How is this diagnosed?  This condition is diagnosed based on:  Your symptoms.  Your medical history.  A physical exam.  Tests to find out if your condition is short-term (acute) or long-term (chronic). Your doctor may:  Check your nose for growths (polyps).  Check your sinuses using a tool that has a light (endoscope).  Check for allergies or germs.  Do imaging tests, such as an MRI or CT scan.  How is this treated?  Treatment for this condition depends on the cause and whether it is short-term or long-term.  If  caused by a virus, your symptoms should go away on their own within 10 days. You may be given medicines to relieve symptoms. They include:  Medicines that shrink swollen tissue in the nose.  Medicines that treat allergies (antihistamines).  A spray that treats swelling of the nostrils.   Rinses that help get rid of thick mucus in your nose (nasal saline washes).  If caused by bacteria, your doctor may wait to see if you will get better without treatment. You may be given antibiotic medicine if you have:  A very bad infection.  A weak body defense system.  If caused by growths in the nose, you may need to have surgery.  Follow these instructions at home:  Medicines  Take, use, or apply over-the-counter and prescription medicines only as told by your doctor. These may include nasal sprays.  If you were prescribed an antibiotic medicine, take it as told by your doctor. Do not stop taking the antibiotic even if you start to feel better.  Hydrate and humidify    Drink enough water to keep your pee (urine) pale yellow.  Use a cool mist humidifier to keep the humidity level in your home above 50%.  Breathe in steam for 10-15 minutes, 3-4 times a day, or as told by your doctor. You can do this in the bathroom while a hot shower is running.  Try not to spend time in cool or dry air.  Rest  Rest as much as you can.  Sleep with your head raised (elevated).  Make sure you get enough sleep each night.  General instructions    Put a warm, moist washcloth on your face 3-4 times a day, or as often as told by your doctor. This will help with discomfort.  Wash your hands often with soap and water. If there is no soap and water, use hand .  Do not smoke. Avoid being around people who are smoking (secondhand smoke).  Keep all follow-up visits as told by your doctor. This is important.  Contact a doctor if:  You have a fever.  Your symptoms get worse.  Your symptoms do not get better within 10 days.  Get help right away  if:  You have a very bad headache.  You cannot stop throwing up (vomiting).  You have very bad pain or swelling around your face or eyes.  You have trouble seeing.  You feel confused.  Your neck is stiff.  You have trouble breathing.  Summary  Sinusitis is swelling of your sinuses. Sinuses are hollow spaces in the bones around your face.  This condition is caused by tissues in your nose that become inflamed or swollen. This traps germs. These can lead to infection.  If you were prescribed an antibiotic medicine, take it as told by your doctor. Do not stop taking it even if you start to feel better.  Keep all follow-up visits as told by your doctor. This is important.  This information is not intended to replace advice given to you by your health care provider. Make sure you discuss any questions you have with your health care provider.  Document Released: 06/05/2009 Document Revised: 05/20/2019 Document Reviewed: 05/20/2019  [employer] Patient Education © 2020 [employer]

## 2022-12-20 NOTE — PROGRESS NOTES
Subjective:   Elaine Banks is a 62 y.o. female who presents for Sinus Pain (Body aches, cough, had chills, phlegm and mucous, chills, had fever last night 101.1, sinus pain, Lt upper mouth pain(tooth pain) x2 days, lv:12/15/22:no sinus problems then )        Sinus Pain  This is a new (Reports sinus pain pressure to pain over the past week) problem. Associated symptoms include congestion, a fever and myalgias. Pertinent negatives include no chills, coughing, nausea, rash, sore throat or vomiting. Associated symptoms comments: There has been community-wide COVID-19 exposure, the patient denies known direct COVID-19 exposure    Reports recent negative COVID-19 and influenza testing. She has tried rest for the symptoms. The treatment provided no relief.   PMH:  has no past medical history on file.  MEDS:   Current Outpatient Medications:     acetaminophen (TYLENOL) 500 MG Tab, Take 500-1,000 mg by mouth every 6 hours as needed., Disp: , Rfl:     Phenyleph-Triprolidine-DM-APAP (MUCINEX NIGHTSHIFT COLD/FLU PO), Take  by mouth., Disp: , Rfl:     amoxicillin-clavulanate (AUGMENTIN) 875-125 MG Tab, Take 1 Tablet by mouth 2 times a day for 7 days., Disp: 14 Tablet, Rfl: 0  ALLERGIES:   Allergies   Allergen Reactions    Nsaids      SURGHX:   Past Surgical History:   Procedure Laterality Date    OTHER ABDOMINAL SURGERY  12/2020    gastric sleeve surgery    BUNIONECTOMY Right 2018    LIPOMA EXCISION Left 2016    ABDOMINAL EXPLORATION       SOCHX:  reports that she has never smoked. She has never used smokeless tobacco. She reports that she does not currently use alcohol. She reports that she does not use drugs.  FH:   Family History   Problem Relation Age of Onset    Breast Cancer Mother     Cancer Mother         mets to brain and bones    Heart Disease Father         heart attack - poor diet and amphetamine use    Diabetes Maternal Uncle     Diabetes Maternal Grandmother      Review of Systems   Constitutional:  Positive for  "fever. Negative for chills.   HENT:  Positive for congestion and sinus pain. Negative for sore throat.    Respiratory:  Negative for cough and shortness of breath.    Gastrointestinal:  Negative for nausea and vomiting.   Musculoskeletal:  Positive for myalgias.   Skin:  Negative for rash.   Neurological:  Negative for dizziness.      Objective:   /78 (BP Location: Left arm, Patient Position: Sitting, BP Cuff Size: Adult)   Pulse 70   Temp 37.3 °C (99.2 °F) (Oral)   Resp 20   Ht 1.651 m (5' 5\")   Wt 77.1 kg (170 lb)   SpO2 97%   BMI 28.29 kg/m²   Physical Exam  Vitals and nursing note reviewed.   Constitutional:       General: She is not in acute distress.     Appearance: She is well-developed.   HENT:      Head: Normocephalic and atraumatic.      Right Ear: Tympanic membrane and external ear normal.      Left Ear: Tympanic membrane and external ear normal.      Nose: Mucosal edema present.      Right Turbinates: Swollen.      Left Turbinates: Swollen.      Comments: Turbinates edematous, purulent exudate noted     Mouth/Throat:      Mouth: Mucous membranes are moist.      Pharynx: Posterior oropharyngeal erythema (posterior pharyngeal drainage and erythema) present. No oropharyngeal exudate.   Eyes:      Conjunctiva/sclera: Conjunctivae normal.   Cardiovascular:      Rate and Rhythm: Normal rate.   Pulmonary:      Effort: Pulmonary effort is normal. No respiratory distress.      Breath sounds: Normal breath sounds. No wheezing or rhonchi.   Abdominal:      General: There is no distension.   Musculoskeletal:         General: Normal range of motion.   Skin:     General: Skin is warm and dry.   Neurological:      General: No focal deficit present.      Mental Status: She is alert and oriented to person, place, and time. Mental status is at baseline.      Gait: Gait (gait at baseline) normal.   Psychiatric:         Judgment: Judgment normal.         Assessment/Plan:   1. Sinusitis, unspecified chronicity, " unspecified location  - amoxicillin-clavulanate (AUGMENTIN) 875-125 MG Tab; Take 1 Tablet by mouth 2 times a day for 7 days.  Dispense: 14 Tablet; Refill: 0    2. Sinus pressure  - amoxicillin-clavulanate (AUGMENTIN) 875-125 MG Tab; Take 1 Tablet by mouth 2 times a day for 7 days.  Dispense: 14 Tablet; Refill: 0    Other orders  - acetaminophen (TYLENOL) 500 MG Tab; Take 500-1,000 mg by mouth every 6 hours as needed.  - Phenyleph-Triprolidine-DM-APAP (MUCINEX NIGHTSHIFT COLD/FLU PO); Take  by mouth.      Medical Decision Making/Course:  In the course of preparing for this visit with review of the pertinent past medical history, recent and past clinic visits, current medications, and performing chart, immunization, medical history and medication reconciliation, and in the further course of obtaining the current history pertinent to the clinic visit today, performing an exam and evaluation, ordering and independently evaluating labs, tests  , and/or procedures, prescribing any recommended new medications as noted above, providing any pertinent counseling and education and recommending further coordination of care including recommendations for symptomatic and supportive measures, at least  14 minutes of total time were spent during this encounter.      Discussed close monitoring, return precautions, and supportive measures of maintaining adequate fluid hydration and caloric intake, relative rest and symptom management as needed for pain and/or fever.    Differential diagnosis, natural history, supportive care, and indications for immediate follow-up discussed.     Advised the patient to follow-up with the primary care physician for recheck, reevaluation, and consideration of further management.    Please note that this dictation was created using voice recognition software. I have worked with consultants from the vendor as well as technical experts from [employer] to optimize the interface. I have made every  reasonable attempt to correct obvious errors, but I expect that there are errors of grammar and possibly content that I did not discover before finalizing the note.

## 2023-11-07 ENCOUNTER — GYNECOLOGY VISIT (OUTPATIENT)
Dept: GYNECOLOGY | Facility: CLINIC | Age: 63
End: 2023-11-07
Payer: COMMERCIAL

## 2023-11-07 VITALS
WEIGHT: 178 LBS | BODY MASS INDEX: 29.66 KG/M2 | DIASTOLIC BLOOD PRESSURE: 76 MMHG | HEIGHT: 65 IN | SYSTOLIC BLOOD PRESSURE: 108 MMHG

## 2023-11-07 DIAGNOSIS — N32.81 OAB (OVERACTIVE BLADDER): ICD-10-CM

## 2023-11-07 DIAGNOSIS — N81.12 CYSTOCELE, LATERAL: ICD-10-CM

## 2023-11-07 DIAGNOSIS — N81.6 RECTOCELE: ICD-10-CM

## 2023-11-07 DIAGNOSIS — N81.2 INCOMPLETE UTEROVAGINAL PROLAPSE: ICD-10-CM

## 2023-11-07 DIAGNOSIS — N95.2 ATROPHIC VAGINITIS: ICD-10-CM

## 2023-11-07 DIAGNOSIS — N39.42 INCONTINENCE WITHOUT SENSORY AWARENESS: ICD-10-CM

## 2023-11-07 PROCEDURE — 99204 OFFICE O/P NEW MOD 45 MIN: CPT | Performed by: STUDENT IN AN ORGANIZED HEALTH CARE EDUCATION/TRAINING PROGRAM

## 2023-11-07 PROCEDURE — 3074F SYST BP LT 130 MM HG: CPT | Performed by: STUDENT IN AN ORGANIZED HEALTH CARE EDUCATION/TRAINING PROGRAM

## 2023-11-07 PROCEDURE — 3078F DIAST BP <80 MM HG: CPT | Performed by: STUDENT IN AN ORGANIZED HEALTH CARE EDUCATION/TRAINING PROGRAM

## 2023-11-07 RX ORDER — ESTRADIOL 0.1 MG/G
CREAM VAGINAL
Qty: 1 EACH | Refills: 3 | Status: SHIPPED | OUTPATIENT
Start: 2023-11-07

## 2023-11-07 ASSESSMENT — FIBROSIS 4 INDEX: FIB4 SCORE: 1.59

## 2023-11-07 NOTE — PROGRESS NOTES
Urogynecology & Reconstructive Pelvic Surgery - Consultation Visit    Elaine Banks MRN:[id number] :1960    Referred by: Taryn Cox MD    Reason for Visit:   Chief Complaint   Patient presents with    Gynecologic Exam     NP         Subjective     History of Presenting Illness:    Elaine Banks is a 63 y.o. year old P3 who presents for the evaluation and management of prolapse.     She has 1.5 years of prolaspe symptoms, worsening, interferes with work and exercise.  This comes far outside the vagina    Also has frequent urinary incontinence throughout the day but she is unaware of what is happening, not associated with either urgency or stress leaks    She previously tried pessary therapy but these fell out.  She is interested in definitive surgical management.    Prior Abdominal/Pelvic surgery:   Gastric sleeve     Prior treatment:   Kegels  Pessary     Fluid intake:   3-4 cups of water    Pelvic floor symptom review:     Bladder:   Voids per day: 8-10 Voids per night: 1-2      Urinary incontinence episodes per day: hard to quantify    Urge leakage:  None   Stress leakage: With Cough   Continuous / insensible urine loss: Yes   Nocturnal enuresis: No    Leakage volume: Moderate   Number of pads/day: 1-2    Bladder emptying: Complete   Voiding symptoms: Post-Void Dribble and Weak Stream   UTI in last 12 months: no   Other urologic history: no      Prolapse:     Prolapse symptoms: Bulge, Exteriorized Tissue, and Pelvic Pressure   Degree of prolapse: Beyond Introitus   Duration of prolapse symptoms: years      Bowel:    Constipation: Yes   Bowel movements per day: every other day    Straining to empty bowels: No    Splinting to evacuate: No    Painful evacuation: No    Difficulty emptying rectum: sometimes   Incontinence to stool: once per week, staining   Blood in stool: No    Hemorrhoids: No         Sexual function:    Sexually active: No    Gender of partners: Male        Past medical and surgical  "history    Past obstetric history   Number of vaginal deliveries: 3   Number of  deliveries: 0   History of vacuum/forceps: Yes   History of obstetric anal sphincter injury: No     Past gynecological history:    Last menstrual period: No LMP recorded. Patient is postmenopausal.   History of abnormal uterine bleeding: No    History of fibroids: No    History of endometrial polyps:  No    History of endometriosis: No    History of cervical dysplasia: No    Last pap: 23 neg        Past medical history:No past medical history on file.  Past surgical history:  Past Surgical History:   Procedure Laterality Date    OTHER ABDOMINAL SURGERY  2020    gastric sleeve surgery    BUNIONECTOMY Right 2018    LIPOMA EXCISION Left 2016    ABDOMINAL EXPLORATION       Medications:has a current medication list which includes the following prescription(s): estradiol, acetaminophen, and phenyleph-triprolidine-dm-apap.  Allergies:Nsaids  Family history:  Family History   Problem Relation Age of Onset    Breast Cancer Mother     Cancer Mother         mets to brain and bones    Heart Disease Father         heart attack - poor diet and amphetamine use    Diabetes Maternal Uncle     Diabetes Maternal Grandmother      Social history: reports that she has never smoked. She has never used smokeless tobacco. She reports that she does not currently use alcohol. She reports that she does not use drugs.    Review of systems: A full review of systems was performed, and negative with the exception of want is noted above in the HPI.        Objective        /76 (BP Location: Right arm, Patient Position: Sitting, BP Cuff Size: Adult)   Ht 5' 5\"   Wt 178 lb   BMI 29.62 kg/m²     Physical Exam  Vitals reviewed. Exam conducted with a chaperone present (MA - see notes.).   Constitutional:       Appearance: Normal appearance.   HENT:      Head: Normocephalic.      Mouth/Throat:      Mouth: Mucous membranes are moist.   Cardiovascular: "      Rate and Rhythm: Normal rate.   Pulmonary:      Effort: Pulmonary effort is normal.   Abdominal:      Palpations: Abdomen is soft. There is no mass.      Tenderness: There is no abdominal tenderness.   Skin:     General: Skin is warm and dry.   Neurological:      Mental Status: She is alert.   Psychiatric:         Mood and Affect: Mood normal.         Genitourinary:    Vulva: WNL   Bulbocavernosus reflex: Intact   Anal wink reflex: Intact   Perineal sensation: WNL   Urethra: WNL   Vagina: Atrophic   Atrophy: Moderate   Cough stress test: Negative    Pelvic floor:    POP-Q: Aa +3 / Ba +6 / TVL 10.5 / C +5 / D -2 / Ap -1 / Bp -1 / GH 6 / PB 2   AaBa are +1 with prolapse reduced   Prolapse stage: 3   Paravaginal defect: left>right, large.    Cervical elongation: No    Urethral tenderness: No    Bladder/ suprapubic tenderness: No    Levator tenderness: None   Levator muscle tone: Hypertonic   Pelvic floor contraction strength (modified Oxford scale): 1=Flicker   Pelvic floor contraction duration: Brief    Bimanual exam: Small, Mobile Uterus       Procedure Performed: No    Diagnostic test and records review:      Post-void residual: 6 mL, performed by Bladder Scanner    Labs: n/a    Radiology: n/a    Procedural: n/a    Documentation reviewed: Prior EMR Records    Outside records reviewed: 9 pages           Assessment & Plan     Elaine Banks is a 63 y.o. year old P3 with stage III uterovaginal prolapse and urinary incontinence. We discussed my recommendations for further diagnosis and treatment at length today.     1. Incomplete uterovaginal prolapse  2. Cystocele, lateral  3. Rectocele  She has symptomatic pelvic organ prolapse, anterior/apical/uterine predominant. I reviewed the clinical findings and discussed the pathogenesis extensively, including genetic tendency, aging, menopause and childbirth injuries. Also discussed options for management, including both nonsurgical and surgical options.   Nonsurgical  options include both expectant management and pessary use. I discussed different types of pessary as well as pessary care.  She was also previously tried pessary therapy without success.  I think this is due to her significant perineal/genital hiatal weakness.  She prefers definitive surgical management   Given her stiff significant anterior and apical prolapse with paravaginal detachments that does not reduce well with apical suspension alone, I think she is an optimal candidate for laparoscopic approach with hysterectomy.  This can either be via native tissue (nonmesh) with uterosacral suspension and paravaginal detachment repairs with perineal reconstruction, or alternatively a mesh augmented sacrocolpopexy.  She is given the recurrence risks with native tissue repair (approach 20% retreatment rate), versus mesh augmentation (5% or less in most studies).  Mesh does come with some elevated risk of exposure of implanted materials which happens in 1 to 5% of cases long-term, and this risk can potentially be mitigated with continued use of vaginal estrogen, cervical preservation, and surgical technique.  She opts for the most definitive treatment for her prolapse via: Robotic assisted supracervical hysterectomy, bilateral salpingo-oophorectomy, sacrocolpopexy, possible paravaginal repair, posterior repair/perineorrhaphy, possible incontinence procedure, cystourethroscopy and all indicated procedures.  In addition to my verbal counseling today she received written counseling detailing the procedure, risk, benefits, recovery.  She is instructed to review this before her preoperative visit   She was counseled on ovarian preservation versus removal at time of surgery.  Preservation may lead to some residual estrogen effect until she is 67 years old, removal as best chance of preventing ovarian or tubal cancer.  She is most interested in preventing cancer and desires oophorectomy at time of surgery.  Due to the unclear  nature of her incontinence I recommend preoperative urodynamic testing  Due to the high activity nature of her job at [employer], I recommend 6 weeks off of work afterwards.  She should bring preoperative clearance paperwork to her visit or have it faxed over        4. OAB (overactive bladder)  5. Incontinence without sensory awareness  She has leakage throughout the day without any obvious predisposing factors such as activity or urgency.  This may be related to her incomplete bladder emptying.  Due to the unclear nature of her symptoms she requires urodynamics preoperatively.  She understands that stress incontinence can be exacerbated or worsened with prolapse repair, and potentially be repaired at the time of surgery.  Urgency is usually observed in many patients noticed improvement in symptoms with reduction of prolapse alone.  She may require further treatment down the line.      6. Genitourinary syndrome of menopause  Her exam confirms vaginal atrophy / genitorurinary syndrome of menopause. This is very common and due to low estrogen levels, which render the vaginal tissue thin, irritated, and open to colonization with gut catrachito. This can lead to irritation, dryness, painful sex, urinary infections and urinary urgency. Discussed risks, benefits, and indications for vaginal estrogen therapy.  Vaginal estrogen has negligible absorption into the bloodstream and is not associated with increased risks for uterine or breast cancers.The effects of vaginal estrogen can take weeks to months.    - estradiol (ESTRACE VAGINAL) 0.1 MG/GM vaginal cream; Apply 1g cream inside vagina twice per week  Dispense: 1 Each; Refill: 3             Syed Sharma MD, FACOG  Urogynecology and Reconstructive Pelvic Surgery  Department of Obstetrics and Gynecology  [facility]        This medical record contains text that has been entered with the assistance of computer voice recognition and  dictation software.  Therefore, it may contain unintended errors in text, spelling, punctuation, or grammar

## 2023-11-07 NOTE — PATIENT INSTRUCTIONS
Pre-op: What you should know before your surgery  Laparoscopic/robotic reconstructive surgery for prolapse  (mesh-augmented)      What you should know before your surgery  You are scheduled for surgery to fix your prolapse (vaginal bulge) using sutures and lightweight mesh to suspend pelvic structures back into a supported position. These surgeries are minimally-invasive, using only small “keyhole” cuts on the abdomen and in the vagina. The documents in this packet will outline each part of the surgery. There may be a few “possible” surgeries listed. We use the word “possible” because we tailor the surgery based on your needs. This means we won't know how much surgery you'll need until after you receive anesthesia and fall asleep.     When you fall asleep, the pelvic muscles will relax, allowing us to determine how much surgery you need. In general, we will do as few procedures as possible to fix your prolapse but address each area as needed.     Goals of prolapse surgery: The primary goal of surgery is to repair the prolapse and eliminate the symptoms of a vaginal bulge and pressure. Depending on your symptoms, the surgery may possibly improve bladder emptying and/or rectal emptying, as well as urinary incontinence.      Prolapse recurrence:  Your procedure will utilize a permanent implanted mesh material to enhance the support of your pelvic structures, and reduce the likelihood of your prolapse returning. This will be described in more detail below in the details of your surgery. However, even with this mesh, there is still a long-term risk of prolapse returning (30% of women), and needing an additional surgery (<10% of women).     Sexual function:  Following surgical repair, most patients experience improvements in their sexual function. Surgery tends to improve the general discomfort of sex associated with prolapse. However, if you have a long history of pain with sex (either externally or internally), this  is unlikely to be due to prolapse. Therefore, surgery may not improve these symptoms.     Surgery involves the cutting and re-sewing of the vaginal tissues. Scar tissue may form after surgery, which can lead to painful sex for some patients. In many patients, this new pain goes away over time or can be improved with pelvic physical therapy, lubrication, dilators, or vaginal estrogen.     Bladder urgency and incontinence after surgery:  Bladder tests may be performed before your surgery to see whether you are at risk for new or worsening urinary leakage after prolapse repair. Our bladder testing is a great tool to find out who is at risk for urinary leakage, but it is not perfect. There is a chance you may have new or increased leakage with coughing, sneezing, or laughing after surgery. Problems with leakage can be addressed in a number of ways. Bladder urgency and/or frequency often improves after surgery, but it may worsen in some patients. We have various medications and therapies that can help with this urgency after surgery, if necessary.    Risk of postoperative pain:   Pain after prolapse surgery is a normal part of the healing process, but usually well-tolerated. Common areas of pain include the pelvis, buttocks, hips and back, often related to positioning.   Try changing your position when sitting or resting in bed to relieve the pain    Expect to have some pain when you are discharged home. This should improve with time and with use of medications. See “after surgery” instructions for more details on pain control.      General risks of pelvic reconstructive surgery: Specific risks for your procedure are discussed separately  Anesthesia: With modern anesthetics and monitoring equipment, complications due to anesthesia are very rare. Your anesthesiologist will discuss what will be most suitable for you on the day of surgery  Bleeding: All surgery results in bleeding, however this surgery usually amounts to  blood loss between a tablespoon and a teacup. Large amounts of blood loss that requires a blood transfusion are not common (only 1-2% of women) in prolapse surgery.  Blood transfusion, if needed, is safe. Minor reactions like fever or allergy occur in less than 1% of transfusions. Riskan infection or mismatched blood is very rare (less than 1 out of every 100,000 transfusions).   Infection: The most common infection with prolapse surgery is a urinary tract infection (UTI). This is easily treated. Wound infections occur in 2-3% of patients. Rarely, infection of the abdominal/vaginal wounds may occur, or abscesses in the pelvis may develop. These infections may need to be treated with antibiotics or another procedure to fix them. Risk factors for infections and wound complications include diabetes, smoking, obesity, and other chronic illnesses.  Blood Clots: Clots in the blood vessels of the legs and lungs are potentially dangerous and can occur in patients undergoing prolapse surgery. This is prevented with leg compression during surgery, and sometimes, an injected blood thinner. We strongly encourage you to stay mobile because this is an important way to prevent clots.  Damage to surrounding organs. The pelvic organs are all very close together. The risk of damage to other organs increases if you have had prior pelvic surgeries, as well as a history of endometriosis or pelvic infection. These conditions can cause scar tissue to develop in the pelvis. Scar tissue can cause organs to stick together, making the surgery more difficult.    Ureter and bladder damage: The ureters are tubes that carry urine from the kidneys to the bladder. They travel very close to the uterus and vagina. The bladder is attached to both your uterus and the front of the vagina. Damage/injury can happen during prolapse-repair procedures. A cystoscopy (camera in the bladder) will be done during your surgery to ensure there is no bladder or  ureter damage/injury. If injury occurs, it may require temporary placement of a stent (drainage tube). In rare cases, a larger abdominal incision may be needed to repair the injury. A bladder catheter may need to stay in place temporarily after surgery.  Bowel damage: Bowel damage/injury is uncommon during your surgery. Any damage that is seen in surgery will be fixed. Very rarely, a temporary ostomy (connection of bowel to the front of the abdomen and collection of stool with a bag) is required for a higher-risk bowel injury.  Vascular damage: Major damage to blood vessels is uncommon. If this occurs, it may require a larger surgery and/or blood transfusion.  Fistula: A fistula is an abnormal connection between the vagina and either the bladder or rectum. A fistula leads to leakage of urine or stool. These are rare complications that arise during healing from pelvic surgery that sometimes require additional surgeries to correct. We take great care is during your surgery to prevent these fistulas. If they do occur, your Urogynecologist is the leading expert in fixing them.        Main Procedure:    Laparoscopic/robotic supra-cervical hysterectomy and sacrocervicopexy   (removal of uterus and suspension of cervix to backbone with mesh)       Once you are asleep, small “keyhole” incisions (smaller than ½ inch) will be made in the abdomen in order to place our instruments. Your surgeon may use a robotic system to help complete the surgery through the small incisions. The top of the uterus and your fallopian tubes will then be removed, leaving the lower portion of the uterus (cervix) in place. This is called a hysterectomy and salpingectomy. If you have decided to also remove the ovaries (oophorectomy), this will also be performed. Then, a lightweight permanent mesh will be sewn onto the cervix and the front and back surfaces of the vagina. This will lift up the prolapse and attach to a strong band of tissue on your  sacrum (lower back bone). We will then look into your bladder with a camera to make sure that no damage was done, and that your ureters (the tubes that carry urine from the kidneys to the bladder) are working. Sometimes we are unable to perform the mesh suspension safely, and may need to adjust to a different approach using other structures around the vagina.    The mesh we use is a lightweight permanent material. This material is stronger than your own tissues, which means there is less chance of the prolapse coming back again. Please note that the abdominal mesh for this procedure is not included in the recent controversies involving vaginal meshes.     Everybody's body responds to permanent material differently. 2-5% of women who undergo this surgery may note pain with sex, or notice the mesh exposed through the vagina in the future.  This is often easily treated with medication or a small procedure. In rarer instances the mesh can erode into surrounding structures, or lead to pain or recurrent infections. Sometimes the mesh may have to be removed, which would entail additional surgery.  Since we are not removing your cervix, you will need to continue with routine Pap screening for cervical cancer with your gynecologist.        After this procedure is complete, you will be re-examined and then proceed with the following possible procedures:    Main surgical procedure:  Posterior repair, perineorrhaphy  (fix prolapse of the back of the vagina/rectum and pelvic muscles)        The entire procedure will be completed in a minimally invasive manner, with all incisions inside of the vagina. Once you are asleep, a thorough exam will be performed to confirm the areas needing repair. A cut is made along the back wall of the vagina where the rectum is pushing down, and the skin above the rectum is  from the underlying supportive tissue. This stronger tissue underneath is then repaired using sutures that will  "dissolve over time. Sometimes excess skin is removed. The skin is then closed.     If the area between the vagina and the anus (called the perineum) is weak, then sutures will be placed to make that area stronger. This is called a \"perineorrhaphy.\" This will be just like a repair of an episiotomy or a vaginal tear after having a baby.     The risk of these procedures is similar to those mentioned in the “pre-op” leaflet. However, any surgery to the back wall of the vagina carries a higher risk of pain with sexual intercourse after surgery (up to 10%) due to scar formation. Great care is taken not to narrow the vagina more than necessary. The perineorrhaphy (or episiotomy type of repair) can be uncomfortable and may be difficult to sit normally for up to 6 weeks after surgery. You may use a doughnut cushion to sit on if needed.            Post-op: What to expect after your surgery      Recovery room  You can expect to stay in the recovery room for a few hours until you are alert. There may be a gauze packing in your vagina, which will be removed before you go home. Pain is normal after surgery but should be tolerable. Your pain will become less over the first 2 weeks. If your pain is difficult to control or you need more nursing care, you will stay in the hospital overnight. Most patients do very well going home on the day of surgery.     Bladder function  You will wake up with a small tube (catheter) in your bladder. A bladder test, called a “void trial”, will be performed by the nurse before you go home. The test is done to make sure you can empty your bladder normally. To do the bladder test, the nurse will fill your bladder with sterile water, remove the catheter, and give you 30 minutes to try and urinate (pee) on your own. If you cannot urinate, or if you only urinate a small amount, you will need to:   Go home with a catheter that stays in your bladder OR  Learn to put a catheter into your bladder a few times " a day to empty it    Use of a catheter is temporary and usually needed for 2-4 days. It is very common for the bladder to work slowly, or sluggishly, after this type of surgery. One in every 3-4 patients will require some type of catheterization. An antibiotic may be prescribed while the catheter is in place to decrease the risk of infection.    Call the surgeon for treatment, if you have signs and symptoms of a urinary tract infection, including:  Burning with urination  Bladder pain  Worsening need to urinate right away,  Urine with a bad smell    Vaginal care  Do not go swimming, take sitting baths, and have sexual intercourse for 6 weeks after surgery Do not place anything in your vagina except vaginal estrogen cream, if instructed to do so by your surgeon.   Vaginal discharge and bleeding/spotting is also normal through the entire 6-week recovery. Sometimes small sutures will fall out of the vagina as they dissolve. This is normal. Contact the office with any heavy bleeding, foul discharge or worsening pain.. Contact us with any heavy bleeding, foul discharge or worsening pain.     Pain management  Surgical pain is controlled in most patients with only acetaminophen (Tylenol) and non-steroidal anti-inflammatory drugs (NSAIDs), such as ibuprofen (Advil). These drugs can be taken together because they do not interact with each other. Check your prescription for specific dosing instructions. A cold compress can help with pain in the vaginal area.  Sometimes, a short course of narcotics, such as oxycodone or hydromorphone is required. We do not recommend using narcotics regularly as it can lead to constipation or dizziness and falls. Do not drive or operate heavy machinery while using narcotics. You are unlikely to become addicted if you need to take a narcotic medication a few times within the first week of your surgery.  After the first few days to one week, your pain should decrease and you should not have pain  severe enough to need narcotics. If you continue having severe pain, contact your surgeon for re-evaluation.     Abdominal wound care  The incisions on your abdomen will be closed with either small bandages or a surgical glue. There are also tiny dissolving sutures beneath the skin. You can shower with these in place. In shower, let the soapy warm water run over you incisions. Do not scrub or wipe you incisions. The bandages will fall off on their own, or you can remove them after at least 3 days if they become discolored or dirty. The glue will also fall off on its own after a few weeks. Small sutures that pop out through the skin are normal and will dissolve over time. Contact us if you feel increasing pain or warmth at the incision. Also, call if you see increased redness or discharge (pus) at the incision.      Bowel function  Constipation is common after surgery, and it sometimes take several days before having a normal bowel movement. It is important to use a bowel regimen to keep your stools soft and avoid straining with bowel movements, which may damage the prolapse repair before it has healed. Most patients will be given a prescription for a stool softener (docusate) as well as a gentle laxative (Miralax or lactulose), which adds water to the stool to make it easier to pass. Take these daily throughout your recovery, and hold for a day if you develop diarrhea. Please call us if you experience any repeated episodes of vomiting, worsening abdominal pain/bloating, or are unable to have a bowel movement for more than 3 days.     Activity restrictions  During the first 6 weeks you should avoid any type of heavy lifting.  Gentle walking is good exercise. Start with about 10 minutes a day when you feel ready and build up gradually. Avoid repetitive squatting or bending at the waist. Avoid any fitness-type training, aerobics, etc. for at least 6 weeks after surgery. Listen to your body during the recovery period,  and increase activity when you feel comfortable. Generally, you will need 4-6 weeks off work. This period may be longer if you have a very physical job.    Return to sex  When your surgeon clears you to resume sex (if desired), you should start out slowly and to use adequate lubrication. Your vagina and pelvis have been re-structured, and it can take time to get used to sex after surgery. Most scar tissue softens over time and discomfort improves. If discomfort does not go away, contact us to discuss to schedule an exam and to discuss further options. In some cases, your surgeon may prescribe a low dose of vaginal estrogen to help with vaginal dryness and pain that may happen with sex.

## 2023-11-07 NOTE — PROGRESS NOTES
New patient referred for complete uterovaginal prolapse  PVR=6   Patient informed, verbally understood. States she sees Dr. Rajeev Xavier and will f/u with him if things do not resolve.

## 2024-02-02 ENCOUNTER — APPOINTMENT (OUTPATIENT)
Dept: ADMISSIONS | Facility: MEDICAL CENTER | Age: 64
End: 2024-02-02
Attending: STUDENT IN AN ORGANIZED HEALTH CARE EDUCATION/TRAINING PROGRAM
Payer: COMMERCIAL

## 2024-02-12 ENCOUNTER — PRE-ADMISSION TESTING (OUTPATIENT)
Dept: ADMISSIONS | Facility: MEDICAL CENTER | Age: 64
End: 2024-02-12
Attending: STUDENT IN AN ORGANIZED HEALTH CARE EDUCATION/TRAINING PROGRAM
Payer: COMMERCIAL

## 2024-02-12 ENCOUNTER — TELEPHONE (OUTPATIENT)
Dept: OBGYN | Facility: CLINIC | Age: 64
End: 2024-02-12

## 2024-02-12 NOTE — TELEPHONE ENCOUNTER
Caller Name: Elaine Banks    Call Back Number: [phone]    How would the patient prefer to be contacted with a response: Phone call do NOT leave a detailed message    Pt called and states that she has a dental surgery/procedure on 3/4/23.  Pt would like to know if she can proceed with dental or cancel her dental appointment.  Please advise. Thank you.    Pt is scheduled for Surgery with Dr. Sharma on  3/13/24 for:  ROBOTIC ASSISTED SUPRACERVICAL HYSTERECTOMY, BILATERAL SALPINGO OOPHORECTOMY, SACROCOLPOPEXY, POSSIBLE PARAVAGINAL REPAIR/PERINEORRHAPHY, POSSIBLE MIDURETHRAL SLING, CYSTOURETHROSCOPY

## 2024-03-03 NOTE — PROCEDURES
Procedure note: Complex urodynamic testing    Procedure performed:    -     49366 Complex Uroflowmetry  27920 Complex CMG w/ voiding pressure study AND urethral pressure  42697 EMG studies anal or urethral sphincter   04669 Intraabdominal catheter       Indication: Elaine Banks is a 63 y.o. year old with OAB (overactive bladder). Incontinence without sensory awareness.   Bladder symptoms:   Voids per day: 8-10     Voids per night: 1-2                 Urinary incontinence episodes per day: hard to quantify               Urge leakage:  None              Stress leakage: With Cough              Continuous / insensible urine loss: Yes              Nocturnal enuresis: No               Leakage volume: Moderate              Number of pads/day: 1-2               Bladder emptying: Complete              Voiding symptoms: Post-Void Dribble and Weak Stream              UTI in last 12 months: no              Other urologic history: no                She presents for complex urodynamic testing today to fully elucidate her bladder function and symptom pathophysiology, in order to guide further treatment, and to evaluate if an anti-incontinence procedure is indicated at her upcoming prolapse repair.     Verbal consent was obtained after review of risk and benefit.     Chaperone:  Rochelle Ha MA,     Procedure: The patient was taken to the urodynamic suite and placed in the urodynamic chair. She underwent sterile prep with betadine prior to catheterization. There was a negative urinalysis. Air-charged catheters were placed in the urethra/bladder and vagina. Urodynamics were performed using routine techniques. Prolapse was reduced with a cotton scopette for stress testing. There were no complications.     Antibiotic given: None    Urodynamic findings:     Preliminary Uroflometry     Flow pattern: Patient unable to void.   Post-void residual: 62 mL      Filling cystometrogram    First sensation: 13 mL  First desire: 101  mL  Strong Desire: 230 mL  Urodynamic capacity: 337 mL   Stress leakage: Yes  Uninhibited detrusor contractions present: No  Leakage with DO: No  Leak point pressures  At 150mL volume: 151 cm H2O, no leak noted with or without urethral cath with cough or valsalva  At 300mL volume: 110 cm H2O, very small leak noted  Compliance: Normal    Urethral pressure profile    Maximum urethral closing pressure (MUCP): 61 cm H2O  Morphology: Normal    Pressure voiding study    The patient's voiding mechanism was accomplished by detrusor contraction and valsalva  Max flow: 28 mL/sec  Average flow: 11 mL/sec  Post-void residual: 13 mL  Pdet at peak flow: 5.5 cm H2O  Flow time: 29 sec  Pelvic floor EMG silenced during voiding: No, leads were wet    Pelvic floor EMG: Normal       UDS procedure performed by FAHAD Rios RNFA      Assessment:     She has completed urodynamic testing, which was uncomplicated.     Filling phase: Normal compliance and capacity.  Stress incontinence demonstrated towards capacity.  No uninhibited detrusor contractions.    Voiding phase: Complete emptying on pressure flow with detrusor contraction, with continuous fluctuant pattern, likely secondary to prolapse    Plan:   She was counseled on urodynamic findings  See office encounter for full procedural counseling  Counseled on normal post-UDS symptoms including burning and possible hematuria. If this persists after 2 days she should call or send [employer] message.         Syed Sharma MD, FACOG    Female Pelvic Medicine and Reconstructive Surgery  Department of Obstetrics and Gynecology  [facility]

## 2024-03-04 ENCOUNTER — PRE-ADMISSION TESTING (OUTPATIENT)
Dept: ADMISSIONS | Facility: MEDICAL CENTER | Age: 64
End: 2024-03-04
Attending: STUDENT IN AN ORGANIZED HEALTH CARE EDUCATION/TRAINING PROGRAM
Payer: COMMERCIAL

## 2024-03-04 DIAGNOSIS — Z01.810 PRE-OPERATIVE CARDIOVASCULAR EXAMINATION: ICD-10-CM

## 2024-03-04 DIAGNOSIS — Z01.812 PRE-OPERATIVE LABORATORY EXAMINATION: ICD-10-CM

## 2024-03-04 LAB
ANION GAP SERPL CALC-SCNC: 11 MMOL/L (ref 7–16)
BASOPHILS # BLD AUTO: 0.4 % (ref 0–1.8)
BASOPHILS # BLD: 0.02 K/UL (ref 0–0.12)
BUN SERPL-MCNC: 12 MG/DL (ref 8–22)
CALCIUM SERPL-MCNC: 8.8 MG/DL (ref 8.5–10.5)
CHLORIDE SERPL-SCNC: 107 MMOL/L (ref 96–112)
CO2 SERPL-SCNC: 25 MMOL/L (ref 20–33)
CREAT SERPL-MCNC: 0.86 MG/DL (ref 0.5–1.4)
EKG IMPRESSION: NORMAL
EOSINOPHIL # BLD AUTO: 0.17 K/UL (ref 0–0.51)
EOSINOPHIL NFR BLD: 3.6 % (ref 0–6.9)
ERYTHROCYTE [DISTWIDTH] IN BLOOD BY AUTOMATED COUNT: 42.4 FL (ref 35.9–50)
EST. AVERAGE GLUCOSE BLD GHB EST-MCNC: 105 MG/DL
GFR SERPLBLD CREATININE-BSD FMLA CKD-EPI: 76 ML/MIN/1.73 M 2
GLUCOSE SERPL-MCNC: 127 MG/DL (ref 65–99)
HBA1C MFR BLD: 5.3 % (ref 4–5.6)
HCT VFR BLD AUTO: 48.2 % (ref 37–47)
HGB BLD-MCNC: 16 G/DL (ref 12–16)
IMM GRANULOCYTES # BLD AUTO: 0.01 K/UL (ref 0–0.11)
IMM GRANULOCYTES NFR BLD AUTO: 0.2 % (ref 0–0.9)
LYMPHOCYTES # BLD AUTO: 1.01 K/UL (ref 1–4.8)
LYMPHOCYTES NFR BLD: 21.3 % (ref 22–41)
MCH RBC QN AUTO: 29 PG (ref 27–33)
MCHC RBC AUTO-ENTMCNC: 33.2 G/DL (ref 32.2–35.5)
MCV RBC AUTO: 87.5 FL (ref 81.4–97.8)
MONOCYTES # BLD AUTO: 0.28 K/UL (ref 0–0.85)
MONOCYTES NFR BLD AUTO: 5.9 % (ref 0–13.4)
NEUTROPHILS # BLD AUTO: 3.26 K/UL (ref 1.82–7.42)
NEUTROPHILS NFR BLD: 68.6 % (ref 44–72)
NRBC # BLD AUTO: 0 K/UL
NRBC BLD-RTO: 0 /100 WBC (ref 0–0.2)
PLATELET # BLD AUTO: 252 K/UL (ref 164–446)
PMV BLD AUTO: 10 FL (ref 9–12.9)
POTASSIUM SERPL-SCNC: 4 MMOL/L (ref 3.6–5.5)
RBC # BLD AUTO: 5.51 M/UL (ref 4.2–5.4)
SODIUM SERPL-SCNC: 143 MMOL/L (ref 135–145)
WBC # BLD AUTO: 4.8 K/UL (ref 4.8–10.8)

## 2024-03-04 PROCEDURE — 93010 ELECTROCARDIOGRAM REPORT: CPT | Performed by: INTERNAL MEDICINE

## 2024-03-04 PROCEDURE — 36415 COLL VENOUS BLD VENIPUNCTURE: CPT

## 2024-03-04 PROCEDURE — 80048 BASIC METABOLIC PNL TOTAL CA: CPT

## 2024-03-04 PROCEDURE — 93005 ELECTROCARDIOGRAM TRACING: CPT

## 2024-03-04 PROCEDURE — 83036 HEMOGLOBIN GLYCOSYLATED A1C: CPT

## 2024-03-04 PROCEDURE — 85025 COMPLETE CBC W/AUTO DIFF WBC: CPT

## 2024-03-05 ENCOUNTER — PROCEDURE VISIT (OUTPATIENT)
Dept: GYNECOLOGY | Facility: CLINIC | Age: 64
End: 2024-03-05
Payer: COMMERCIAL

## 2024-03-05 VITALS
SYSTOLIC BLOOD PRESSURE: 130 MMHG | WEIGHT: 185 LBS | HEART RATE: 80 BPM | BODY MASS INDEX: 30.79 KG/M2 | DIASTOLIC BLOOD PRESSURE: 80 MMHG

## 2024-03-05 DIAGNOSIS — N39.46 URINARY INCONTINENCE, MIXED: ICD-10-CM

## 2024-03-05 DIAGNOSIS — N39.0 RECURRENT UTI: ICD-10-CM

## 2024-03-05 LAB
APPEARANCE UR: CLEAR
BILIRUB UR STRIP-MCNC: NEGATIVE MG/DL
COLOR UR AUTO: YELLOW
GLUCOSE UR STRIP.AUTO-MCNC: NEGATIVE MG/DL
KETONES UR STRIP.AUTO-MCNC: NEGATIVE MG/DL
LEUKOCYTE ESTERASE UR QL STRIP.AUTO: NEGATIVE
NITRITE UR QL STRIP.AUTO: NEGATIVE
PH UR STRIP.AUTO: 5.5 [PH] (ref 5–8)
PROT UR QL STRIP: NEGATIVE MG/DL
RBC UR QL AUTO: NEGATIVE
SP GR UR STRIP.AUTO: 1.02
UROBILINOGEN UR STRIP-MCNC: 0.2 MG/DL

## 2024-03-05 PROCEDURE — 51797 INTRAABDOMINAL PRESSURE TEST: CPT | Performed by: NURSE PRACTITIONER

## 2024-03-05 PROCEDURE — 51784 ANAL/URINARY MUSCLE STUDY: CPT | Performed by: NURSE PRACTITIONER

## 2024-03-05 PROCEDURE — 81002 URINALYSIS NONAUTO W/O SCOPE: CPT | Performed by: NURSE PRACTITIONER

## 2024-03-05 PROCEDURE — 51729 CYSTOMETROGRAM W/VP&UP: CPT | Performed by: NURSE PRACTITIONER

## 2024-03-05 PROCEDURE — 51741 ELECTRO-UROFLOWMETRY FIRST: CPT | Performed by: NURSE PRACTITIONER

## 2024-03-05 PROCEDURE — 99214 OFFICE O/P EST MOD 30 MIN: CPT | Mod: 25 | Performed by: STUDENT IN AN ORGANIZED HEALTH CARE EDUCATION/TRAINING PROGRAM

## 2024-03-05 ASSESSMENT — FIBROSIS 4 INDEX: FIB4 SCORE: 1.28

## 2024-03-05 NOTE — PROGRESS NOTES
Urogynecology & Reconstructive Pelvic Surgery - Follow Up    Elaine Banks MRN:[id number] :1960    Referred by: Taryn Cox MD    Reason for Visit:   No chief complaint on file.        Subjective     History of Presenting Illness:    Elaine Banks is a 63 y.o. year old P3 with prolapse who presents for follow up    She has had a chance to review all preoperative materials and brings questions  She underwent urodynamic testing earlier today which demonstrated some stress incontinence towards capacity, and she also has occasional stress incontinence at baseline.      Initial HPI: She presents for the evaluation and management of prolapse.     She has 1.5 years of prolaspe symptoms, worsening, interferes with work and exercise.  This comes far outside the vagina    Also has frequent urinary incontinence throughout the day but she is unaware of what is happening, not associated with either urgency or stress leaks    She previously tried pessary therapy but these fell out.  She is interested in definitive surgical management.    Prior Abdominal/Pelvic surgery:   Gastric sleeve     Prior treatment:   Kegels  Pessary     Fluid intake:   3-4 cups of water    Pelvic floor symptom review:     Bladder:   Voids per day: 8-10 Voids per night: 1-2      Urinary incontinence episodes per day: hard to quantify    Urge leakage:  None   Stress leakage: With Cough   Continuous / insensible urine loss: Yes   Nocturnal enuresis: No    Leakage volume: Moderate   Number of pads/day: 1-2    Bladder emptying: Complete   Voiding symptoms: Post-Void Dribble and Weak Stream   UTI in last 12 months: no   Other urologic history: no      Prolapse:     Prolapse symptoms: Bulge, Exteriorized Tissue, and Pelvic Pressure   Degree of prolapse: Beyond Introitus   Duration of prolapse symptoms: years      Bowel:    Constipation: Yes   Bowel movements per day: every other day    Straining to empty bowels: No    Splinting to evacuate: No     Painful evacuation: No    Difficulty emptying rectum: sometimes   Incontinence to stool: once per week, staining   Blood in stool: No    Hemorrhoids: No         Sexual function:    Sexually active: No    Gender of partners: Male        Past medical and surgical history    Past obstetric history   Number of vaginal deliveries: 3   Number of  deliveries: 0   History of vacuum/forceps: Yes   History of obstetric anal sphincter injury: No     Past gynecological history:    Last menstrual period: No LMP recorded. Patient is postmenopausal.   History of abnormal uterine bleeding: No    History of fibroids: No    History of endometrial polyps:  No    History of endometriosis: No    History of cervical dysplasia: No    Last pap: 23 neg        Past medical history:  Past Medical History:   Diagnosis Date    Gynecological disorder Oct 2021    Prolapse uterus, bladder and vaginal rear wall    High cholesterol     Not sure of the exact date    Sleep apnea  or 2017    Have since had vsg weight loss surgery     Past surgical history:  Past Surgical History:   Procedure Laterality Date    OTHER ABDOMINAL SURGERY  2020    gastric sleeve surgery    BUNIONECTOMY Right 2018    LIPOMA EXCISION Left 2016    OTHER ORTHOPEDIC SURGERY  2016     Medications:has a current medication list which includes the following prescription(s): multivitamin, omega-3 fatty acids, calcium citrate, biotin, vitamin d, estradiol, and acetaminophen.  Allergies:Food and Nsaids  Family history:  Family History   Problem Relation Age of Onset    Breast Cancer Mother     Cancer Mother         mets to brain and bones    Heart Disease Father         heart attack - poor diet and amphetamine use    Diabetes Maternal Uncle     Diabetes Maternal Grandmother     Hypertension Sister     Hypertension Brother      Social history: reports that she has never smoked. She has never used smokeless tobacco. She reports that she does not currently use  alcohol. She reports that she does not currently use drugs.    Review of systems: A full review of systems was performed, and negative with the exception of want is noted above in the HPI.        Objective        There were no vitals taken for this visit.    Physical Exam  Vitals reviewed. Exam conducted with a chaperone present (MA - see notes.).   Constitutional:       Appearance: Normal appearance.   HENT:      Head: Normocephalic.      Mouth/Throat:      Mouth: Mucous membranes are moist.   Cardiovascular:      Rate and Rhythm: Normal rate.   Pulmonary:      Effort: Pulmonary effort is normal.   Abdominal:      Palpations: Abdomen is soft. There is no mass.      Tenderness: There is no abdominal tenderness.   Skin:     General: Skin is warm and dry.   Neurological:      Mental Status: She is alert.   Psychiatric:         Mood and Affect: Mood normal.         Genitourinary: Prior   Vulva: WNL   Bulbocavernosus reflex: Intact   Anal wink reflex: Intact   Perineal sensation: WNL   Urethra: WNL   Vagina: Atrophic   Atrophy: Moderate   Cough stress test: Negative    Pelvic floor:    POP-Q: Prior Aa +3 / Ba +6 / TVL 10.5 / C +5 / D -2 / Ap -1 / Bp -1 / GH 6 / PB 2   AaBa are +1 with prolapse reduced   Prolapse stage: 3   Paravaginal defect: left>right, large.    Cervical elongation: No    Urethral tenderness: No    Bladder/ suprapubic tenderness: No    Levator tenderness: None   Levator muscle tone: Hypertonic   Pelvic floor contraction strength (modified Oxford scale): 1=Flicker   Pelvic floor contraction duration: Brief    Bimanual exam: Small, Mobile Uterus       Procedure Performed:     Urodynamics  Filling phase: Normal compliance and capacity.  Stress incontinence demonstrated towards capacity.  No uninhibited detrusor contractions.    Voiding phase: Complete emptying on pressure flow with detrusor contraction, with continuous fluctuant pattern, likely secondary to prolapse    Diagnostic test and records  review:      Post-void residual: 6 mL, performed by Bladder Scanner    Labs:     omponent  Ref Range & Units 1 d ago 1 yr ago   Sodium  135 - 145 mmol/L 143 141   Potassium  3.6 - 5.5 mmol/L 4.0 4.2   Chloride  96 - 112 mmol/L 107 107   Co2  20 - 33 mmol/L 25 26   Glucose  65 - 99 mg/dL 127 High  85   Bun  8 - 22 mg/dL 12 16     Component  Ref Range & Units 1 d ago 1 yr ago   WBC  4.8 - 10.8 K/uL 4.8 4.1 Low    RBC  4.20 - 5.40 M/uL 5.51 High  5.17   Hemoglobin  12.0 - 16.0 g/dL 16.0 15.2   Hematocrit  37.0 - 47.0 % 48.2 High  44.9     Ref Range & Units 1 d ago   Glycohemoglobin  4.0 - 5.6 % 5.3       Radiology: n/a    Procedural: n/a    Documentation reviewed: Prior EMR Records    Outside records reviewed: 9 pages           Assessment & Plan     Elaine Banks is a 63 y.o. year old P3 with stage III uterovaginal prolapse and urinary incontinence.     1. Incomplete uterovaginal prolapse  2. Cystocele, lateral  3. Rectocele  At her prior visit as well as today we reviewed all treatment options for prolapse including conservative/observation, pessary, and both vaginal and laparoscopic approaches as well as native tissue and mesh augmented approaches. After detailed counseling about all prolapse treatment options and shared decision-making, she opts for a mesh augmented reconstructive approach to prolapse repair via  Robotic assisted supracervical hysterectomy, bilateral salpingo-oophorectomy, sacrocolpopexy, possible paravaginal repair, posterior repair/perineorrhaphy, midurethral sling.  cystourethroscopy and all indicated procedures.  She has reviewed all preoperative written materials and expressed understanding about the procedure, risks, benefits, and recovery    Pre-operative urodynamics did demonstrate stress urinary incontinence with reduction of prolapse and she was counseled on either observation of symptoms versus performing a sling procedure.  Due to her presence of stress incontinence and wanting to get  all things treated simultaneously she opts for a concomitant sling.  She understands this is also place of surgical mesh a very similar material to her sacrocolpopexy with similar risks.      Benefits of surgery were reviewed, including functional outcomes (bladder/bowel/sexual). Risks of surgery were  also discussed including anesthesia, bleeding, infection, damage to surrounding organs (bladder, ureter, urethra, bowel, blood vessel, nerves), possible blood transfusion, recurrent prolapse, transient buttock pain if sacrospinous suspension performed, mesh exposure/erosion, transient voiding dysfunction requiring catheterization, new/worsening urinary incontinence, pain with sex.     Specifically she was counselled as to what to expect on the day of surgery in the holding area, counseled that medical students may be involved in her care. She will likely go home on the same day as the surgery. She was counseled on what to expect in the recovery room including voiding trial and possible vaginal packing removal, as well as what to expect if voiding trial is unsuccessful, which would be transient catheterization.   Discussed trajectory of recovery, including maximizing NSAIDs and Tylenol, and that narcotics are not routinely given.  Discussed restrictions including heavy lifting that requires straining, driving while on narcotics, nothing in the vagina and no bathing/swimming for at least 6 weeks until evaluated in the office.  Return to work discussed.  *Please see the corresponding after visit summary counseling packet for detailed counseling provided for the patient.     Labs: nimo zuniga  Pre-op meds: acetaminophen 1000mg PO, phenazopyridine 200mg PO, Cefazolin 2gm IV   Post-op medication plan: ibuprofen (low dose david to gastric sleeve), tylenol, miralax, oxycodone  Home medication review: She should additionally hold all NSAIDs and supplements for 1 week prior to surgery.   She was counseled on ovarian  preservation versus removal at time of surgery.  Preservation may lead to some residual estrogen effect until she is 67 years old, removal as best chance of preventing ovarian or tubal cancer.   She is most interested in preventing cancer and desires oophorectomy at time of surgery.          4. OAB (overactive bladder)  5. Incontinence without sensory awareness  - UDS showed overall normal bladder function but stress incontinence towards capacity with average leak point pressures.  She did not have a worsening of her stress incontinence or significant leakage, but she does have stress incontinence at baseline.  I counseled on observation versus concomitant sling procedure, and she opts for sling.   Urgency is usually observed in many patients noticed improvement in symptoms with reduction of prolapse alone.  She may require further treatment down the line.      6. Genitourinary syndrome of menopause  She has vaginal atrophy on examination. Reviewed risks, benefits, and indications for vaginal estrogen therapy.  Continue with vaginal estrogen therapy twice weekly.                  Syed Sharma MD, FACOG  Urogynecology and Reconstructive Pelvic Surgery  Department of Obstetrics and Gynecology  [facility], [place] [facility]        This medical record contains text that has been entered with the assistance of computer voice recognition and dictation software.  Therefore, it may contain unintended errors in text, spelling, punctuation, or grammar

## 2024-03-13 ENCOUNTER — ANESTHESIA EVENT (OUTPATIENT)
Dept: SURGERY | Facility: MEDICAL CENTER | Age: 64
End: 2024-03-13
Payer: COMMERCIAL

## 2024-03-13 ENCOUNTER — ANESTHESIA (OUTPATIENT)
Dept: SURGERY | Facility: MEDICAL CENTER | Age: 64
End: 2024-03-13
Payer: COMMERCIAL

## 2024-03-13 ENCOUNTER — HOSPITAL ENCOUNTER (OUTPATIENT)
Facility: MEDICAL CENTER | Age: 64
End: 2024-03-13
Attending: STUDENT IN AN ORGANIZED HEALTH CARE EDUCATION/TRAINING PROGRAM | Admitting: STUDENT IN AN ORGANIZED HEALTH CARE EDUCATION/TRAINING PROGRAM
Payer: COMMERCIAL

## 2024-03-13 ENCOUNTER — PHARMACY VISIT (OUTPATIENT)
Dept: PHARMACY | Facility: MEDICAL CENTER | Age: 64
End: 2024-03-13
Payer: MEDICARE

## 2024-03-13 VITALS
WEIGHT: 185.85 LBS | HEART RATE: 100 BPM | HEIGHT: 65 IN | SYSTOLIC BLOOD PRESSURE: 123 MMHG | DIASTOLIC BLOOD PRESSURE: 74 MMHG | OXYGEN SATURATION: 96 % | RESPIRATION RATE: 10 BRPM | TEMPERATURE: 96.7 F | BODY MASS INDEX: 30.96 KG/M2

## 2024-03-13 DIAGNOSIS — G89.18 ACUTE POST-OPERATIVE PAIN: ICD-10-CM

## 2024-03-13 PROCEDURE — 160031 HCHG SURGERY MINUTES - 1ST 30 MINS LEVEL 5: Performed by: STUDENT IN AN ORGANIZED HEALTH CARE EDUCATION/TRAINING PROGRAM

## 2024-03-13 PROCEDURE — 160042 HCHG SURGERY MINUTES - EA ADDL 1 MIN LEVEL 5: Performed by: STUDENT IN AN ORGANIZED HEALTH CARE EDUCATION/TRAINING PROGRAM

## 2024-03-13 PROCEDURE — C1771 REP DEV, URINARY, W/SLING: HCPCS | Performed by: STUDENT IN AN ORGANIZED HEALTH CARE EDUCATION/TRAINING PROGRAM

## 2024-03-13 PROCEDURE — 57250 REPAIR RECTUM & VAGINA: CPT | Performed by: STUDENT IN AN ORGANIZED HEALTH CARE EDUCATION/TRAINING PROGRAM

## 2024-03-13 PROCEDURE — 58542 LSH W/T/O UT 250 G OR LESS: CPT | Performed by: STUDENT IN AN ORGANIZED HEALTH CARE EDUCATION/TRAINING PROGRAM

## 2024-03-13 PROCEDURE — 160002 HCHG RECOVERY MINUTES (STAT): Performed by: STUDENT IN AN ORGANIZED HEALTH CARE EDUCATION/TRAINING PROGRAM

## 2024-03-13 PROCEDURE — 57288 REPAIR BLADDER DEFECT: CPT | Mod: AS | Performed by: NURSE PRACTITIONER

## 2024-03-13 PROCEDURE — 88305 TISSUE EXAM BY PATHOLOGIST: CPT

## 2024-03-13 PROCEDURE — 57423 REPAIR PARAVAG DEFECT LAP: CPT | Performed by: STUDENT IN AN ORGANIZED HEALTH CARE EDUCATION/TRAINING PROGRAM

## 2024-03-13 PROCEDURE — 700105 HCHG RX REV CODE 258: Performed by: STUDENT IN AN ORGANIZED HEALTH CARE EDUCATION/TRAINING PROGRAM

## 2024-03-13 PROCEDURE — 160025 RECOVERY II MINUTES (STATS): Performed by: STUDENT IN AN ORGANIZED HEALTH CARE EDUCATION/TRAINING PROGRAM

## 2024-03-13 PROCEDURE — 160036 HCHG PACU - EA ADDL 30 MINS PHASE I: Performed by: STUDENT IN AN ORGANIZED HEALTH CARE EDUCATION/TRAINING PROGRAM

## 2024-03-13 PROCEDURE — 160035 HCHG PACU - 1ST 60 MINS PHASE I: Performed by: STUDENT IN AN ORGANIZED HEALTH CARE EDUCATION/TRAINING PROGRAM

## 2024-03-13 PROCEDURE — 700101 HCHG RX REV CODE 250: Performed by: STUDENT IN AN ORGANIZED HEALTH CARE EDUCATION/TRAINING PROGRAM

## 2024-03-13 PROCEDURE — 57250 REPAIR RECTUM & VAGINA: CPT | Mod: AS | Performed by: NURSE PRACTITIONER

## 2024-03-13 PROCEDURE — A9270 NON-COVERED ITEM OR SERVICE: HCPCS | Performed by: STUDENT IN AN ORGANIZED HEALTH CARE EDUCATION/TRAINING PROGRAM

## 2024-03-13 PROCEDURE — 57423 REPAIR PARAVAG DEFECT LAP: CPT | Mod: AS | Performed by: NURSE PRACTITIONER

## 2024-03-13 PROCEDURE — 160048 HCHG OR STATISTICAL LEVEL 1-5: Performed by: STUDENT IN AN ORGANIZED HEALTH CARE EDUCATION/TRAINING PROGRAM

## 2024-03-13 PROCEDURE — 57425 LAPAROSCOPY SURG COLPOPEXY: CPT | Performed by: STUDENT IN AN ORGANIZED HEALTH CARE EDUCATION/TRAINING PROGRAM

## 2024-03-13 PROCEDURE — 88307 TISSUE EXAM BY PATHOLOGIST: CPT | Mod: 59

## 2024-03-13 PROCEDURE — 700111 HCHG RX REV CODE 636 W/ 250 OVERRIDE (IP): Mod: JZ | Performed by: STUDENT IN AN ORGANIZED HEALTH CARE EDUCATION/TRAINING PROGRAM

## 2024-03-13 PROCEDURE — 700101 HCHG RX REV CODE 250: Performed by: ANESTHESIOLOGY

## 2024-03-13 PROCEDURE — 700102 HCHG RX REV CODE 250 W/ 637 OVERRIDE(OP): Performed by: STUDENT IN AN ORGANIZED HEALTH CARE EDUCATION/TRAINING PROGRAM

## 2024-03-13 PROCEDURE — A9270 NON-COVERED ITEM OR SERVICE: HCPCS | Performed by: ANESTHESIOLOGY

## 2024-03-13 PROCEDURE — 700111 HCHG RX REV CODE 636 W/ 250 OVERRIDE (IP): Performed by: ANESTHESIOLOGY

## 2024-03-13 PROCEDURE — C1781 MESH (IMPLANTABLE): HCPCS | Performed by: STUDENT IN AN ORGANIZED HEALTH CARE EDUCATION/TRAINING PROGRAM

## 2024-03-13 PROCEDURE — 110454 HCHG SHELL REV 250: Performed by: STUDENT IN AN ORGANIZED HEALTH CARE EDUCATION/TRAINING PROGRAM

## 2024-03-13 PROCEDURE — 502714 HCHG ROBOTIC SURGERY SERVICES: Performed by: STUDENT IN AN ORGANIZED HEALTH CARE EDUCATION/TRAINING PROGRAM

## 2024-03-13 PROCEDURE — 57425 LAPAROSCOPY SURG COLPOPEXY: CPT | Mod: AS | Performed by: NURSE PRACTITIONER

## 2024-03-13 PROCEDURE — 160047 HCHG PACU  - EA ADDL 30 MINS PHASE II: Performed by: STUDENT IN AN ORGANIZED HEALTH CARE EDUCATION/TRAINING PROGRAM

## 2024-03-13 PROCEDURE — 58542 LSH W/T/O UT 250 G OR LESS: CPT | Mod: AS | Performed by: NURSE PRACTITIONER

## 2024-03-13 PROCEDURE — 160009 HCHG ANES TIME/MIN: Performed by: STUDENT IN AN ORGANIZED HEALTH CARE EDUCATION/TRAINING PROGRAM

## 2024-03-13 PROCEDURE — RXMED WILLOW AMBULATORY MEDICATION CHARGE: Performed by: NURSE PRACTITIONER

## 2024-03-13 PROCEDURE — 57288 REPAIR BLADDER DEFECT: CPT | Performed by: STUDENT IN AN ORGANIZED HEALTH CARE EDUCATION/TRAINING PROGRAM

## 2024-03-13 PROCEDURE — 700102 HCHG RX REV CODE 250 W/ 637 OVERRIDE(OP): Performed by: ANESTHESIOLOGY

## 2024-03-13 PROCEDURE — 160046 HCHG PACU - 1ST 60 MINS PHASE II: Performed by: STUDENT IN AN ORGANIZED HEALTH CARE EDUCATION/TRAINING PROGRAM

## 2024-03-13 DEVICE — SYSTEM TRANSVAGINAL MID URETHRAL SLING ADVANTAGE FIT BLUE (1EA): Type: IMPLANTABLE DEVICE | Site: URETHRA | Status: FUNCTIONAL

## 2024-03-13 DEVICE — MESH SURGICAL UPSYLON 35.4CM (1EA): Type: IMPLANTABLE DEVICE | Site: PELVIS | Status: FUNCTIONAL

## 2024-03-13 RX ORDER — SODIUM CHLORIDE, SODIUM LACTATE, POTASSIUM CHLORIDE, CALCIUM CHLORIDE 600; 310; 30; 20 MG/100ML; MG/100ML; MG/100ML; MG/100ML
INJECTION, SOLUTION INTRAVENOUS CONTINUOUS
Status: DISCONTINUED | OUTPATIENT
Start: 2024-03-13 | End: 2024-03-13 | Stop reason: HOSPADM

## 2024-03-13 RX ORDER — GABAPENTIN 300 MG/1
300 CAPSULE ORAL ONCE
Status: COMPLETED | OUTPATIENT
Start: 2024-03-13 | End: 2024-03-13

## 2024-03-13 RX ORDER — HALOPERIDOL 5 MG/ML
1 INJECTION INTRAMUSCULAR
Status: DISCONTINUED | OUTPATIENT
Start: 2024-03-13 | End: 2024-03-13 | Stop reason: HOSPADM

## 2024-03-13 RX ORDER — METOPROLOL TARTRATE 1 MG/ML
1 INJECTION, SOLUTION INTRAVENOUS
Status: DISCONTINUED | OUTPATIENT
Start: 2024-03-13 | End: 2024-03-13 | Stop reason: HOSPADM

## 2024-03-13 RX ORDER — HYDROMORPHONE HYDROCHLORIDE 1 MG/ML
0.2 INJECTION, SOLUTION INTRAMUSCULAR; INTRAVENOUS; SUBCUTANEOUS
Status: DISCONTINUED | OUTPATIENT
Start: 2024-03-13 | End: 2024-03-13 | Stop reason: HOSPADM

## 2024-03-13 RX ORDER — GENTAMICIN SULFATE 40 MG/ML
INJECTION, SOLUTION INTRAMUSCULAR; INTRAVENOUS
Status: DISCONTINUED | OUTPATIENT
Start: 2024-03-13 | End: 2024-03-13 | Stop reason: HOSPADM

## 2024-03-13 RX ORDER — EPHEDRINE SULFATE 50 MG/ML
5 INJECTION, SOLUTION INTRAVENOUS
Status: DISCONTINUED | OUTPATIENT
Start: 2024-03-13 | End: 2024-03-13 | Stop reason: HOSPADM

## 2024-03-13 RX ORDER — ACETAMINOPHEN 500 MG
1000 TABLET ORAL
Status: DISCONTINUED | OUTPATIENT
Start: 2024-03-13 | End: 2024-03-13 | Stop reason: HOSPADM

## 2024-03-13 RX ORDER — SCOLOPAMINE TRANSDERMAL SYSTEM 1 MG/1
1 PATCH, EXTENDED RELEASE TRANSDERMAL ONCE
Status: DISCONTINUED | OUTPATIENT
Start: 2024-03-13 | End: 2024-03-13 | Stop reason: HOSPADM

## 2024-03-13 RX ORDER — CEFAZOLIN SODIUM 1 G/3ML
INJECTION, POWDER, FOR SOLUTION INTRAMUSCULAR; INTRAVENOUS PRN
Status: DISCONTINUED | OUTPATIENT
Start: 2024-03-13 | End: 2024-03-13 | Stop reason: SURG

## 2024-03-13 RX ORDER — DIPHENHYDRAMINE HYDROCHLORIDE 50 MG/ML
12.5 INJECTION INTRAMUSCULAR; INTRAVENOUS
Status: DISCONTINUED | OUTPATIENT
Start: 2024-03-13 | End: 2024-03-13 | Stop reason: HOSPADM

## 2024-03-13 RX ORDER — HYDROMORPHONE HYDROCHLORIDE 2 MG/ML
INJECTION, SOLUTION INTRAMUSCULAR; INTRAVENOUS; SUBCUTANEOUS PRN
Status: DISCONTINUED | OUTPATIENT
Start: 2024-03-13 | End: 2024-03-13 | Stop reason: SURG

## 2024-03-13 RX ORDER — ACETAMINOPHEN 500 MG
1000 TABLET ORAL ONCE
Status: COMPLETED | OUTPATIENT
Start: 2024-03-13 | End: 2024-03-13

## 2024-03-13 RX ORDER — EPHEDRINE SULFATE 50 MG/ML
INJECTION, SOLUTION INTRAVENOUS PRN
Status: DISCONTINUED | OUTPATIENT
Start: 2024-03-13 | End: 2024-03-13 | Stop reason: SURG

## 2024-03-13 RX ORDER — ROCURONIUM BROMIDE 10 MG/ML
INJECTION, SOLUTION INTRAVENOUS PRN
Status: DISCONTINUED | OUTPATIENT
Start: 2024-03-13 | End: 2024-03-13 | Stop reason: SURG

## 2024-03-13 RX ORDER — HYDRALAZINE HYDROCHLORIDE 20 MG/ML
5 INJECTION INTRAMUSCULAR; INTRAVENOUS
Status: DISCONTINUED | OUTPATIENT
Start: 2024-03-13 | End: 2024-03-13 | Stop reason: HOSPADM

## 2024-03-13 RX ORDER — MIDAZOLAM HYDROCHLORIDE 1 MG/ML
1 INJECTION INTRAMUSCULAR; INTRAVENOUS
Status: DISCONTINUED | OUTPATIENT
Start: 2024-03-13 | End: 2024-03-13 | Stop reason: HOSPADM

## 2024-03-13 RX ORDER — BUPIVACAINE HYDROCHLORIDE 2.5 MG/ML
INJECTION, SOLUTION EPIDURAL; INFILTRATION; INTRACAUDAL
Status: DISCONTINUED | OUTPATIENT
Start: 2024-03-13 | End: 2024-03-13 | Stop reason: HOSPADM

## 2024-03-13 RX ORDER — OXYCODONE HCL 5 MG/5 ML
5 SOLUTION, ORAL ORAL
Status: COMPLETED | OUTPATIENT
Start: 2024-03-13 | End: 2024-03-13

## 2024-03-13 RX ORDER — OXYCODONE HCL 5 MG/5 ML
10 SOLUTION, ORAL ORAL
Status: COMPLETED | OUTPATIENT
Start: 2024-03-13 | End: 2024-03-13

## 2024-03-13 RX ORDER — ONDANSETRON 2 MG/ML
4 INJECTION INTRAMUSCULAR; INTRAVENOUS
Status: DISCONTINUED | OUTPATIENT
Start: 2024-03-13 | End: 2024-03-13 | Stop reason: HOSPADM

## 2024-03-13 RX ORDER — HYDROMORPHONE HYDROCHLORIDE 1 MG/ML
0.1 INJECTION, SOLUTION INTRAMUSCULAR; INTRAVENOUS; SUBCUTANEOUS
Status: DISCONTINUED | OUTPATIENT
Start: 2024-03-13 | End: 2024-03-13 | Stop reason: HOSPADM

## 2024-03-13 RX ORDER — PHENAZOPYRIDINE HYDROCHLORIDE 200 MG/1
200 TABLET, FILM COATED ORAL
Status: COMPLETED | OUTPATIENT
Start: 2024-03-13 | End: 2024-03-13

## 2024-03-13 RX ORDER — DEXAMETHASONE SODIUM PHOSPHATE 4 MG/ML
INJECTION, SOLUTION INTRA-ARTICULAR; INTRALESIONAL; INTRAMUSCULAR; INTRAVENOUS; SOFT TISSUE PRN
Status: DISCONTINUED | OUTPATIENT
Start: 2024-03-13 | End: 2024-03-13 | Stop reason: SURG

## 2024-03-13 RX ORDER — HYDROMORPHONE HYDROCHLORIDE 1 MG/ML
0.4 INJECTION, SOLUTION INTRAMUSCULAR; INTRAVENOUS; SUBCUTANEOUS
Status: DISCONTINUED | OUTPATIENT
Start: 2024-03-13 | End: 2024-03-13 | Stop reason: HOSPADM

## 2024-03-13 RX ORDER — MIDAZOLAM HYDROCHLORIDE 1 MG/ML
INJECTION INTRAMUSCULAR; INTRAVENOUS PRN
Status: DISCONTINUED | OUTPATIENT
Start: 2024-03-13 | End: 2024-03-13 | Stop reason: SURG

## 2024-03-13 RX ORDER — MEPERIDINE HYDROCHLORIDE 25 MG/ML
12.5 INJECTION INTRAMUSCULAR; INTRAVENOUS; SUBCUTANEOUS
Status: DISCONTINUED | OUTPATIENT
Start: 2024-03-13 | End: 2024-03-13 | Stop reason: HOSPADM

## 2024-03-13 RX ORDER — ONDANSETRON 2 MG/ML
INJECTION INTRAMUSCULAR; INTRAVENOUS PRN
Status: DISCONTINUED | OUTPATIENT
Start: 2024-03-13 | End: 2024-03-13 | Stop reason: SURG

## 2024-03-13 RX ORDER — VANCOMYCIN HYDROCHLORIDE 500 MG/10ML
INJECTION, POWDER, LYOPHILIZED, FOR SOLUTION INTRAVENOUS
Status: COMPLETED | OUTPATIENT
Start: 2024-03-13 | End: 2024-03-13

## 2024-03-13 RX ORDER — KETOROLAC TROMETHAMINE 15 MG/ML
INJECTION, SOLUTION INTRAMUSCULAR; INTRAVENOUS PRN
Status: DISCONTINUED | OUTPATIENT
Start: 2024-03-13 | End: 2024-03-13 | Stop reason: SURG

## 2024-03-13 RX ORDER — LABETALOL HYDROCHLORIDE 5 MG/ML
5 INJECTION, SOLUTION INTRAVENOUS
Status: DISCONTINUED | OUTPATIENT
Start: 2024-03-13 | End: 2024-03-13 | Stop reason: HOSPADM

## 2024-03-13 RX ORDER — LIDOCAINE HYDROCHLORIDE 20 MG/ML
INJECTION, SOLUTION EPIDURAL; INFILTRATION; INTRACAUDAL; PERINEURAL PRN
Status: DISCONTINUED | OUTPATIENT
Start: 2024-03-13 | End: 2024-03-13 | Stop reason: SURG

## 2024-03-13 RX ORDER — SODIUM CHLORIDE, SODIUM LACTATE, POTASSIUM CHLORIDE, CALCIUM CHLORIDE 600; 310; 30; 20 MG/100ML; MG/100ML; MG/100ML; MG/100ML
INJECTION, SOLUTION INTRAVENOUS CONTINUOUS
Status: ACTIVE | OUTPATIENT
Start: 2024-03-13 | End: 2024-03-13

## 2024-03-13 RX ORDER — OXYCODONE HYDROCHLORIDE 5 MG/1
5 TABLET ORAL EVERY 8 HOURS PRN
Qty: 10 TABLET | Refills: 0 | Status: SHIPPED | OUTPATIENT
Start: 2024-03-13 | End: 2024-03-17

## 2024-03-13 RX ADMIN — ROCURONIUM BROMIDE 20 MG: 50 INJECTION, SOLUTION INTRAVENOUS at 15:12

## 2024-03-13 RX ADMIN — EPHEDRINE SULFATE 10 MG: 50 INJECTION, SOLUTION INTRAVENOUS at 17:22

## 2024-03-13 RX ADMIN — SODIUM CHLORIDE, POTASSIUM CHLORIDE, SODIUM LACTATE AND CALCIUM CHLORIDE: 600; 310; 30; 20 INJECTION, SOLUTION INTRAVENOUS at 11:50

## 2024-03-13 RX ADMIN — CEFAZOLIN 2 G: 1 INJECTION, POWDER, FOR SOLUTION INTRAMUSCULAR; INTRAVENOUS at 17:28

## 2024-03-13 RX ADMIN — EPHEDRINE SULFATE 10 MG: 50 INJECTION, SOLUTION INTRAVENOUS at 13:53

## 2024-03-13 RX ADMIN — DEXAMETHASONE SODIUM PHOSPHATE 8 MG: 4 INJECTION INTRA-ARTICULAR; INTRALESIONAL; INTRAMUSCULAR; INTRAVENOUS; SOFT TISSUE at 13:31

## 2024-03-13 RX ADMIN — ROCURONIUM BROMIDE 20 MG: 50 INJECTION, SOLUTION INTRAVENOUS at 14:11

## 2024-03-13 RX ADMIN — SODIUM CHLORIDE, POTASSIUM CHLORIDE, SODIUM LACTATE AND CALCIUM CHLORIDE: 600; 310; 30; 20 INJECTION, SOLUTION INTRAVENOUS at 14:40

## 2024-03-13 RX ADMIN — SUGAMMADEX 200 MG: 100 INJECTION, SOLUTION INTRAVENOUS at 17:34

## 2024-03-13 RX ADMIN — GABAPENTIN 300 MG: 300 CAPSULE ORAL at 11:52

## 2024-03-13 RX ADMIN — LIDOCAINE HYDROCHLORIDE 100 MG: 20 INJECTION, SOLUTION EPIDURAL; INFILTRATION; INTRACAUDAL at 13:31

## 2024-03-13 RX ADMIN — ACETAMINOPHEN 1000 MG: 500 TABLET ORAL at 11:52

## 2024-03-13 RX ADMIN — FENTANYL CITRATE 100 MCG: 50 INJECTION, SOLUTION INTRAMUSCULAR; INTRAVENOUS at 13:31

## 2024-03-13 RX ADMIN — FENTANYL CITRATE 50 MCG: 50 INJECTION, SOLUTION INTRAMUSCULAR; INTRAVENOUS at 14:11

## 2024-03-13 RX ADMIN — PROPOFOL 150 MG: 10 INJECTION, EMULSION INTRAVENOUS at 13:31

## 2024-03-13 RX ADMIN — ROCURONIUM BROMIDE 50 MG: 50 INJECTION, SOLUTION INTRAVENOUS at 13:31

## 2024-03-13 RX ADMIN — ROCURONIUM BROMIDE 10 MG: 50 INJECTION, SOLUTION INTRAVENOUS at 15:46

## 2024-03-13 RX ADMIN — HYDROMORPHONE HYDROCHLORIDE 1 MG: 2 INJECTION INTRAMUSCULAR; INTRAVENOUS; SUBCUTANEOUS at 17:38

## 2024-03-13 RX ADMIN — ROCURONIUM BROMIDE 20 MG: 50 INJECTION, SOLUTION INTRAVENOUS at 16:46

## 2024-03-13 RX ADMIN — OXYCODONE HYDROCHLORIDE 10 MG: 5 SOLUTION ORAL at 18:40

## 2024-03-13 RX ADMIN — ROCURONIUM BROMIDE 20 MG: 50 INJECTION, SOLUTION INTRAVENOUS at 16:12

## 2024-03-13 RX ADMIN — MIDAZOLAM HYDROCHLORIDE 2 MG: 1 INJECTION, SOLUTION INTRAMUSCULAR; INTRAVENOUS at 13:23

## 2024-03-13 RX ADMIN — KETOROLAC TROMETHAMINE 15 MG: 15 INJECTION, SOLUTION INTRAMUSCULAR; INTRAVENOUS at 17:34

## 2024-03-13 RX ADMIN — ROCURONIUM BROMIDE 10 MG: 50 INJECTION, SOLUTION INTRAVENOUS at 17:28

## 2024-03-13 RX ADMIN — PHENAZOPYRIDINE 200 MG: 200 TABLET ORAL at 11:52

## 2024-03-13 RX ADMIN — ONDANSETRON 4 MG: 2 INJECTION INTRAMUSCULAR; INTRAVENOUS at 17:34

## 2024-03-13 RX ADMIN — CEFAZOLIN 2 G: 1 INJECTION, POWDER, FOR SOLUTION INTRAMUSCULAR; INTRAVENOUS at 13:31

## 2024-03-13 RX ADMIN — FENTANYL CITRATE 100 MCG: 50 INJECTION, SOLUTION INTRAMUSCULAR; INTRAVENOUS at 14:37

## 2024-03-13 RX ADMIN — SCOPOLAMINE 1 PATCH: 1.5 PATCH, EXTENDED RELEASE TRANSDERMAL at 11:55

## 2024-03-13 ASSESSMENT — PAIN DESCRIPTION - PAIN TYPE
TYPE: SURGICAL PAIN
TYPE: SURGICAL PAIN;ACUTE PAIN
TYPE: SURGICAL PAIN

## 2024-03-13 ASSESSMENT — FIBROSIS 4 INDEX: FIB4 SCORE: 1.28

## 2024-03-13 NOTE — OP REPORT
OPERATIVE REPORT     Name: Elaine Banks    : 1960    MRN: [id number]       PRE-OP DIAGNOSIS:   Incomplete uterovaginal prolapse  Cystocele, lateral with large paravaginal detachment  Rectocele  Stress urinary incontinence            POST-OP DIAGNOSIS:   Incomplete uterovaginal prolapse  Cystocele, lateral with large bilateral paravaginal detachment  Rectocele  Stress urinary incontinence              PROCEDURE:   Robotic assisted laparoscopic supracervical hysterectomy, bilateral salpingo-oophorectomy (76692) with lysis of adhesions  Robotic sacrocolpopexy (40821)  Robotic paravaginal repair (18819)  Retropubic midurethral sling (70353)  Posterior repair, perineorrhaphy (34400)              SURGEON:   Surgeon(s) and Role:     * Syed Sharma M.D. - Primary     * FAHAD Rios - Assist            ANESTHESIA: General            FINDINGS:       - Exam under anesthesia: Stage 3 prolapse with a large complex cystocele with both midline and significant lateral components with bilateral paravaginal detachments, left larger than the right.  Uterine descent to 1 cm distal to the level of the introitus on traction.  Palpable large rectocele and widened genital hiatus.  Significant vaginal length.  At the completion of the procedure there was excellent tricompartmental support, no sutures were palpated rectally.    - Laparoscopy: No entry injuries to viscera or vasculature.  Over normal appearing abdominal cavity and liver edge without significant adhesions, however she had significant and complex pelvic adhesions which appear likely secondary to either undiagnosed endometriosis or prior pelvic infection.  Adhesions involved the uterus to the cul-de-sac, adnexa to the pelvic sidewall.  Pedunculated posterior fibroid.  Otherwise normal-appearing uterus, bilateral fallopian tubes and ovaries. Ureters visualized bilaterally through the procedure. Normal survey prior to closure   - Cystourethroscopy: Normal  appearing urothelium without lesions, masses, sutures, or perforations. Ureteral orifices noted in the normal orthotopic position, with brisk jets of urine bilaterally after all procedures were completed. Urethra was normal in appearance without evidence of stricture, perforation, or diverticulum.        ESTIMATED BLOOD LOSS: 50 mL            DRAINS: Rosa                   SPECIMENS:   ID Type Source Tests Collected by Time Destination   A : Uterus, Bilateral fallopian tubes and ovaries Tissue Uterus PATHOLOGY SPECIMEN Syed Sharma M.D. 3/13/2024  3:02 PM                IMPLANTS:   Implant Name Type Inv. Item Serial No.  Lot No. LRB No. Used Action   SYSTEM TRANSVAGINAL MID URETHRAL SLING ADVANTAGE FIT BLUE (1EA) Bladder Sling SYSTEM TRANSVAGINAL MID URETHRAL SLING ADVANTAGE FIT BLUE (1EA)  [employer] [id number]  1 Implanted   MESH SURGICAL UPSYLON 35.4CM (1EA) Mesh MESH SURGICAL UPSYLON 35.4CM (1EA)  [employer] [id number]  1 Implanted               COMPLICATIONS: None            DISPOSITION: Discharge            CONDITION: Stable            INDICATION FOR SURGERY:     Elaine Banks is a 63 y.o. year old P3 with stage III uterovaginal prolapse and urinary incontinence. At her prior visit as well as today we reviewed all treatment options for prolapse including conservative/observation, pessary, and both vaginal and laparoscopic approaches as well as native tissue and mesh augmented approaches. After detailed counseling about all prolapse treatment options and shared decision-making, she opts for a mesh augmented reconstructive approach to prolapse repair via robotic assisted supracervical hysterectomy, bilateral salpingo-oophorectomy, sacrocolpopexy, possible paravaginal repair, posterior repair/perineorrhaphy, midurethral sling.  cystourethroscopy and all indicated procedures. She has reviewed all preoperative written materials and expressed understanding about the procedure,  risks, benefits, and recovery. Pre-operative urodynamics did demonstrate stress urinary incontinence with reduction of prolapse and she was counseled on either observation of symptoms versus performing a sling procedure.  Due to her presence of stress incontinence and wanting to get all things treated simultaneously she opts for a concomitant sling.  She understands this is also place of surgical mesh a very similar material to her sacrocolpopexy with similar risks. Benefits of surgery were reviewed, including functional outcomes (bladder/bowel/sexual). Risks of surgery were  also discussed including anesthesia, bleeding, infection, damage to surrounding organs (bladder, ureter, urethra, bowel, blood vessel, nerves), possible blood transfusion, recurrent prolapse, transient buttock pain if sacrospinous suspension performed, mesh exposure/erosion, transient voiding dysfunction requiring catheterization, new/worsening urinary incontinence, pain with sex. All questions were answered and consent was signed and witnessed.        TECHNIQUE:   I spoke with the patient in the preoperative holding area, where again risks, benefits, indications, and alternatives were reviewed and informed consent was obtained.  She was then transferred to the operative suite, where she was identified, procedure was confirmed.  She was placed in dorsal supine position, given general endotracheal anesthesia without difficulty.  She was then placed in a dorsal lithotomy position  in yellowfin stirrups with all pressure points padded.  She was prepared and draped in usual sterile fashion with arms tucked and all pressure points padded.  An appropriate time-out was performed, where patient was identified, procedure was confirmed, and the operative team was introduced.  It was also confirmed that patient did receive cefazolin 2 g IV for prophylaxis, and she also received DVT prophylaxis with sequential compression devices bilaterally throughout the  case.  At this time, exam under anesthesia revealed findings noted above.   A 16-Turkmen Rosa catheter was then placed in the patient's  bladder for drainage throughout the procedure.  The anterior lip of the cervix was visualized with a speculum and grasped with a single-toothed tenaculum. The uterus did not send easily, and I was able to pass a sound or dilator past the internal cervical os despite multiple attempts.  For these reasons a uterine epilator was not placed and the cervix was grasped with a tenaculum for manipulation throughout the case. The handle was covered with a sterile towel.     At this time, attention was turned to the patient's abdomen. After infiltration with marcaine, a 12mm supraumbilical incision was made, and the subcutaneous tissue dissected to the level of the fascia, which was grasped with kocher clamps, elevated, and entered sharply using heavy Berry scissors. The peritoneum was then grasped with hemostats and entered sharply with Metzenbaum scissors. Digital evaluation noted a correct entry into the peritoneal cavity without any anterior abdominal wall adhesions noted. With an S-retractor delineating the entry path, the 12mm Tato balloon trocar was then advanced into the peritoneal cavity, which was insufflated to a pressure of 15mmHg. An 8mm robotic trocar was then placed through the Tato trocar in preparation for docking. The camera was inserted and a full survey was performed with the above findings noted. No entry injuries to bowl, omentum, mesentery or vasculature were visualized.  At this time, on the patient's right lateral side, approximately 8 cm lateral to the umbilicus, an 8 mm incision that was made after injection with marcaine, and an 8 mm robotic trocar was then advanced under direct visualization of the laparoscope without difficulty.  This was repeated with an 8mm robotic trocar approximately 16cm right lateral to the umbilicus. On the patient's left hand side,  two additional robotic ports were placed at 8cm and 16cm lateral to the umbilicus after infiltration of Marcaine, under direct visualization with the laparoscope. Excellent hemostasis was noted at all port sites. At this time, the patient was placed in steep Trendelenburg. The [employer] Xi robot was then brought in for docking, and instruments were placed under direct visualization.     We then proceeded with the robotic assisted laparoscopic supracervical hysterectomy and bilateral salpingo-oophorectomy in the following fashion: As noted in the exam under anesthesia, there is significant pelvic adhesions, likely secondary to prior undiagnosed endometriosis or pelvic infection.  The right adnexa was completely buried amongst these adhesions, and they were slowly taken down in a careful fashion in order to expose the ovary.  The infundibulopelvic ligament was found to be remote from the peristalsing right ureter after dissection away from the surrounding structures retroperitoneally. The right IP ligament was then ligated with bipolar electrocautery and transected with the monopolar scissors with excellent hemostasis noted and care to not cauterize close to the peristalsing ureter.. Dissection was then carried down along the broad ligament to the round ligament which was cauterized and transected. The round ligament on medial traction, the posterior leaflet of the broad ligament was then dissected further down to the level of the internal cervical os, lateralizing the ureter away from the cervix. Attention was then turned anteriorly where the vesicouterine peritoneum was dissected away from the uterus to create a bladder flap, to the level below the level of the cervix.  During this dissection significant posterior pressure Adonis adhesions needed to be taken down in order to fully restore normal anatomy and move the rectum away from the area of dissection.  The uterine vessels were then carefully skeletonized and  cauterized and transected perpendicularly at the level of the internal cervical os with excellent hemostasis noted. Attention was then turned to the contralateral side of the uterus where an identical dissection was performed starting with the infundibulopelvic ligament, through the round ligament creating a bladder flap and finally cauterizing and transecting the uterine vessels. The cervix was then transected perpendicularly at the level of the internal os using monopolar electrocautery with excellent hemostasis noted. The uterus tubes and ovaries were then grasped, placed into a 5 Endo Catch bag, and moved into the upper pelvis for future retrieval at the end of the case. All pedicles were then inspected and found to be hemostatic at this time.    Attention was then turned to the robotic assisted laparoscopic sacrocervicopexy  which proceed in the following fashion: The sigmoid colon was retracted medially with the third arm and the sacral promontory was visualized. The peritoneum overlying the sacral promontory was then lifted and entered sharply with cold scissors. Gentle blunt and sharp dissection was then carried down through presacral adipose tissue to expose the anterior longitudinal ligament of the sacrum. Minimal electrocautery was used and excellent hemostasis was noted. Two interrupted GoreTex sutures were then placed superficially through the anterior longitudinal ligament of the sacrum just inferior to the L5 disc. These were then tucked out of the way for future use in mesh attachment.  A retroperitoneal tunnel for future covering of the mesh was then created using the monopolar scissors traveling in the right pararectal space and exiting at a position just medial to the proximal right uterosacral ligament. Attention was then turned to the anterior dissection. The cervix was grasped with a robotic tenaculum and placed under tension and the bladder was further dissected away from the vagina through  the avascular vesicovaginal space down to the level of the trigone, which is identified with gentle traction on the Rosa balloon. Attention was then turned posteriorly where again traction was placed on the cervix using the tenaculum, and the peritoneum was entered midway down the vagina entering the avascular retrorectal space and dissected down to the level of the perineal body with excellent hemostasis noted. The Restorelle Y-mesh, which had been previously trimmed to the patient's measurements, was then brought into the operative field, and affixed to the cervical stroma using 3 Elwood-Jean Claude sutures anteriorly and 3 Elwood-Jean Claude sutures posteriorly. Attention was then turned posteriorly where the remainder of the posterior leaf of the mesh was attached to the posterior vaginal muscularis using 8 interrupted 2-0 Vicryl sutures with excellent approximation of the mesh to the posterior vaginal tissue without any bunching.  It should be noted that the posterior mesh was not trimmed at all, and due to vaginal length the distal end of the mesh did not reach all the way down to the distal aspect of the dissection or perineum.  Attention was then turned to the anterior leaflet which is also attached to the anterior vaginal muscularis using 8 interrupted 2-0 Vicryl sutures  with excellent approximation of the mesh to the anterior vaginal tissue without any bunching. The Y portion of the mesh was then brought through the previously created retroperitoneal tunnel to the level of the sacrum.  It was noted that, due to the significant vaginal length, the cervix reached at the sacral promontory, and the mesh was tensioned approximately 3 cm below this.  I personally examined for correct tensioning with a vaginal examination, with adequate reduction of prolapse. The Y-portion of the mesh was then affixed to the sacral promontory the previously placed GoreTex sutures. Excess mesh was then trimmed and removed from the operative field.  Excellent hemostasis was noted at the sacrum. The peritoneum overlying the sacrum was then closed using a 2-0 monocryl stratafix suture. The remaining peritoneum was then reapproximated in anterior to posterior fashion using a 2-0 Monocryl stratafix suture, completely covering and retroperitonealized the mesh without visible defects that would allow for viscera to herniate.     Reexamination found that the apex and anterior and posterior walls had been well elevated, but there still were significant paravaginal detachments which would contribute to her prolapse, and decision made to proceed with paravaginal repair robotically.  The  robotic paravaginal repair was performed in the following fashion. Dissection was begun cephalad to the pubic symphysis, with the peritoneum opened broadly and dissected bluntly with minimal electrocautery down through the avascular retropubic space to the level of the pubic bone. On the left lateral side the dissection continued to develop the paravaginal detachment and visualize the arcus tendineus fascia. An identical dissection was carried out over the contralateral paravaginal space. With the help of the vaginal assist, three interrupted PDS sutures were then placed through the arcus fascia and through the vaginal muscularis on the left with care not to breach the vaginal epithelium,and tied down, thus closing the paravaginal detachment. This was repeated on the right, with 2 PDS sutures placed.    Cystourethroscopy was then performed. The Rosa catheter was removed and a 70 degree cystoscope was placed into the bladder under direct visualization and the bladder was backfilled with sterile water. A 360 degree survey of the bladder was then performed with the above findings noted, no sutures, mesh or urothelial defects visualized, and excellent bilateral ureteral reflux with pyridium-stained urine. The bladder was drained of all cystoscopic fluid, the cystoscope removed, and the  Rosa catheter replaced.    All instruments were then removed and the robot undocked. The specimen was brought through the umbilical incision, removed with in-bag morcellation and sent for pathology. All trocars were then removed under direct visualization and the umbilical fascia was closed using a 0-vicryl  interrupted sutures, and skin closed at all sites with 4-0 monocryl subcuticular suture and dermabond. Excellent hemostasis was noted.     The retropubic mid urethral sling procedure then proceeded in the following fashion: The Rosa catheter was placed under traction and the bladder neck was delineated at the level of the Rosa bulb.  Allis clamps used to delineate the mid urethra which was then injected with a solution of 1% lidocaine with dilute epinephrine.  A scalpel was then used to make an incision vertically in the epithelium over the urethra.  Sharp and blunt dissection was then used to dissect the vaginal epithelium away from the underlying pubocervical fascia to the level of the inferior right pubic ramus with Metzenbaum scissors with just enough space for the mesh to pass and lie flat.  This dissection was then repeated on the contralateral side.  Excellent hemostasis was noted.  Attention then turned the suprapubic area where approximately 1 fingerbreadth above the pubic symphysis and 2 cm lateral bilaterally, markings were made for the exit sites of the trochars.  The [employer] retropubic sling trochars were then passed underneath the pubic ramus and up through the anterior abdominal wall bilaterally and held.  Cystourethroscopy was then performed.  The Rosa catheter was removed and the 70 degree cystoscope was advanced into the bladder which was backfilled with sterile saline.  A 360 degree survey was performed which noted normal roll of the trochars behind the bladder wall with no visualized trocar or mesh.  There was also bilateral ureteral efflux of Pyridium stained urine.  No urethral perforations or mesh were visualized.  The cystoscope was removed and the bladder drained of all cystoscopic fluid and the Rosa catheter was replaced.  The sling mesh was then tensioned against an 8 hegar dilator and cut at the anterior abdominal wall, with skin incisions covered with Dermabond. It was confirmed here was no undue tension on the urethra. Another exam noted that there was no perforation of mesh into the vaginal epithelium.  Deep spaces were infiltrated with a small amount of Surgiflo hemostatic agent a help with venous hemostasis.  The vaginal epithelium was then closed with a 2-0 Vicryl running locked suture followed by 2 interrupted 2-0 Vicryl sutures with excellent hemostasis noted.      Attention was then turned posteriorly.  There was a large distal rectocele despite apical and posterior support.  There is also significantly attenuated perineal body and widened genital hiatus that would not be supportive of the pelvis.  As noted, during laparoscopy, even with full utilization of the length of the posterior mesh, this did not reach all the way down to the peritoneum, thus a posterior pair and perineorrhaphy were required.  The posterior repair and perineorrhaphy then proceed in the following fashion: Allis clamps were placed on the posterior introitus in a triangle-shaped fashion on the posterior introitus with care not to over narrow the vaginal introitus.  The area was then infiltrated with 1 % lidocaine with dilute epinephrine.  A triangle-shaped incision was then made over the posterior vaginal wall and the perineal skin with a scalpel.  Skin was then sharply dissected from the underlying central tendon in the perineal body and the rectovaginal fascia out to the level of the levator fascia bilaterally.  The rectocele was then plicated in the midline with 2-0 PDS stratafix in a running layer followed by interrupted 2-0 Vicryl sutures with care to approximate the distal rectovaginal  fascia to the central tendon of the perineal body.  Perineorrhaphy sutures were then placed, and a series starting proximally to distally with interrupted sutures creating a new platform/self without undue tension.  Five 0 Vicryl sutures were placed in total and then held.  Perineorrhaphy suture were crossed and found not to narrow the introitus. The posterior vaginal epithelium was then trimmed.  The epithelium was then closed with a 2-0 Vicryl suture in a running locked fashion with care to incorporate part of the rectovaginal fascia to obliterate the dead space.  The perineorrhaphy sutures were tied serially.  There was good lengthening of the perineal body with an adequate genital hiatus diameter.  A small amount of Surgiflo was placed into the operative field.  The 2-0 Vicryl mucosal sutures then continued in a running fashion to the hymenal ring, which was then tied.   Another rectal exam was then performed with no sutures palpated.   Hemostasis was noted at the completion of the procedure.    All counts were correct.  The patient was then wakened from general anesthesia easily, and brought to the PACU in stable condition. Anticipate discharge home later tonight.         Syed Sharma MD, FACOG  Urogynecology and Reconstructive Pelvic Surgery  Department of Obstetrics and Gynecology  [facility]

## 2024-03-13 NOTE — ANESTHESIA PREPROCEDURE EVALUATION
Case: [id number] Date/Time: 03/13/24 1245    Procedures:       ROBOTIC ASSISTED SUPRACERVICAL HYSTERECTOMY, BILATERAL SALPINGO OOPHORECTOMY      SACROCOLPOPEXY, ROBOT-ASSISTED, USING DA MAAME XI      POSTERIOR REPAIR / PERINEORRHAPHY      MID URETHERAL SLING      REPAIR, DEFECT, PARAVAGINAL, ROBOT-ASSISTED, LAPAROSCOPIC      CYSTOSURETHROCOPY    Pre-op diagnosis: INCOMPLETE UTEROVAGINAL PROLAPSE, CYSTOCELE LATERAL RECTOCELE, STRESS URINARY INCONTINENCE    Location: [place] [address] / SURGERY [facility]    Surgeons: Syed Sharma M.D.            Relevant Problems   No relevant active problems       Physical Exam    Airway   Mallampati: II  TM distance: >3 FB  Neck ROM: full       Cardiovascular - normal exam  Rhythm: regular  Rate: normal  (-) murmur     Dental - normal exam           Pulmonary - normal exam  Breath sounds clear to auscultation     Abdominal    Neurological - normal exam                   Anesthesia Plan    ASA 2       Plan - general       Airway plan will be ETT          Induction: intravenous    Postoperative Plan: Postoperative administration of opioids is intended.    Pertinent diagnostic labs and testing reviewed    Informed Consent:    Anesthetic plan and risks discussed with patient.    Use of blood products discussed with: patient whom consented to blood products.

## 2024-03-13 NOTE — DISCHARGE INSTRUCTIONS
Post-op: What to expect after your surgery    For urgent post-operative questions or concerns, please call Dr. Sharma's direct on-call cell line at [phone].     For less-urgent matters (Monday - Friday), you may send a message through [employer] or call the general Women's Health line at [phone] [phone].     Bladder function  Try to empty your bladder (urinate) at regular intervals by sitting on the toilet and relaxing.  You may need to adjust your positioning (lean forward or back) to empty the bladder fully. It is important that you do not push or strain to empty your bladder.     Call the surgeon at the number above if you cannot urinate. Also, call for treatment if you have signs and symptoms of a urinary tract infection, including:   Burning with urination  Bladder pain  Worsening need to urinate right away  Urine with a bad smell    If you are sent home with a catheter:   Empty the catheter bag when it becomes full. The bag should be kept at a level below your hips to drain properly. When you are asleep, the bag should dangle off the bed. and should dangle off of the bed while you are asleep. You will be called the day after you go home to schedule an office visit to test your bladder and remove the catheter.     Vaginal care:   Do not go swimming, take sitting baths, or have sexual intercourse for 6 weeks after surgery. Do not place anything in the vagina except vaginal estrogen cream, if instructed to do so by your surgeon.     Vaginal discharge and bleeding/spotting is also normal through the entire 6-week recovery. Sometimes small sutures will fall out of the vagina as they dissolve. This is normal. Contact the office with any heavy bleeding soaking through pads, bad-smelling discharge, or worsening pain.     Pain management:  Surgical pain is controlled in most patients with only non-steroidal anti-inflammatory drugs (NSAIDs, such as ibuprofen, “Advil”), and acetaminophen (Tylenol). These drugs can be  taken together without interaction.     In the hospital, your nurse will give you these medications at regular intervals to both treat and to prevent pain. If these are not controlling your pain, you may ask the nurse for additional medication.     When you go home, you will also take NSAIDs and acetaminophen for pain management. I recommend the following at-home pain regimen:    Take ibuprofen 200-400mg three times per day (every 8 hrs), along with tylenol 1000mg three times per day (every 8 hrs), for the first 5-7 days after surgery to prevent and treat pain and inflammation.   After 5-7 days, you may take 400mg iburpfen and 500mg tylenol as-needed   You may also take oxycodone 5 mg three times per day (every 8 hrs) as needed for post operative pain not controlled with ibuprofen and tylenol alone.     Sometimes, a short course of narcotics such as oxycodone and hydromorphone is  required, but this is not routine. We do not recommend using narcotics regularly as it can lead to constipation or dizziness, and falls.     Do not drive or operate heavy machinery while using narcotics. You are unlikely to become addicted if you need to take a narcotic medication a few times within the first week of your surgery.  After the first few days to one week, your pain should decrease and you should not have pain severe enough to need narcotics. If you continue having severe pain, contact your surgeon for re-evaluation.     Abdominal wound care  The incisions on your abdomen will be closed with either small bandages or a surgical glue. There are also tiny dissolving sutures beneath the skin. You can shower with these in place. In the shower, let the soapy water run over your incisions. Do not scrub or wipe your incisions. Keep the incisions dry for the remainder of the day/night. The bandages will fall off on their own, or you can remove them after at least 3 days if they become discolored or dirty. The glue will also fall off on  its own after a few weeks. Small sutures that pop out through the skin are normal and will dissolve over time.    Call us if you feel increasing pain, redness, discharge or warmth at the incision.     Bowel function  Constipation is common after surgery. This means it may take several days before having a normal bowel movement. It is important to take extra steps to keep your stools soft to avoid straining with bowel movements. Straining may damage the prolapse repair before it has healed. Most patients will be given a prescription for a stool softener (docusate) as well as a gentle laxative (Miralax or lactulose). These mediations adds water to the stool to make it easier to pass. Take them daily throughout your recovery. Hold for a day if you develop diarrhea.     Call us if you experience any repeated episodes of vomiting, worsening abdominal pain/bloating, or are unable to have a bowel movement for more than 3 days.     Activity restrictions  During the first 6 weeks avoid any type of heavy lifting that requires you to strain.  Gentle walking is good exercise. Start with about 10 minutes a day when you feel ready and build up gradually. Avoid repetitive squatting or bending at the waist.Avoid any fitness-type training, aerobics, etc. for at least 6 weeks after surgery. Generally, you will need 4-6 weeks off work. This period may be longer if you have a very physical job.    Return to sex  When your surgeon clears you to resume sex (if desired), begin slowly and to use enough lubrication to help ease the discomfort. Because your vagina and pelvis have been re-structured, and it can take time to get used to sex after surgery. As scar tissue softens over time you will feel less discomfort. If discomfort does not go away, contact the office to schedule an exam and to discuss other options. In some cases, your surgeon may prescribe a low dose of vaginal estrogen to help with vaginal dryness and pain that may happen  with sex.     HOME CARE INSTRUCTIONS    ACTIVITY: Rest and take it easy for the first 24 hours.  A responsible adult is recommended to remain with you during that time.  It is normal to feel sleepy.  We encourage you to not do anything that requires balance, judgment or coordination.    FOR 24 HOURS DO NOT:  Drive, operate machinery or run household appliances.  Drink beer or alcoholic beverages.  Make important decisions or sign legal documents.    SPECIAL INSTRUCTIONS: see MD instructions above.    DIET: To avoid nausea, slowly advance diet as tolerated, avoiding spicy or greasy foods for the first day.  Add more substantial food to your diet according to your physician's instructions.  Babies can be fed formula or breast milk as soon as they are hungry.  INCREASE FLUIDS AND FIBER TO AVOID CONSTIPATION.    MEDICATIONS: Resume taking daily medication.  Take prescribed pain medication with food.  If no medication is prescribed, you may take non-aspirin pain medication if needed.  PAIN MEDICATION CAN BE VERY CONSTIPATING.  Take a stool softener or laxative such as senokot, pericolace, or milk of magnesia if needed.    Prescription given for Oxycodone.  Last pain medication given at 6:40 PM.    A follow-up appointment should be arranged with your doctor in 1-2; call to schedule.    You should CALL YOUR PHYSICIAN if you develop:  Fever greater than 101 degrees F.  Pain not relieved by medication, or persistent nausea or vomiting.  Excessive bleeding (blood soaking through dressing) or unexpected drainage from the wound.  Extreme redness or swelling around the incision site, drainage of pus or foul smelling drainage.  Inability to urinate or empty your bladder within 8 hours.  Problems with breathing or chest pain.    You should call 911 if you develop problems with breathing or chest pain.    If you are unable to contact your doctor or surgical center, you should go to the nearest emergency room or urgent care center.   Physician's telephone #: [phone].    MILD FLU-LIKE SYMPTOMS ARE NORMAL.  YOU MAY EXPERIENCE GENERALIZED MUSCLE ACHES, THROAT IRRITATION, HEADACHE AND/OR SOME NAUSEA.    If any questions arise, call your doctor.  If your doctor is not available, please feel free to call the Surgical Center at [phone].  The Center is open Monday through Friday from 7AM to 7PM.      A registered nurse may call you a few days after your surgery to see how you are doing after your procedure.    You may also receive a survey in the mail within the next two weeks and we ask that you take a few moments to complete the survey and return it to us.  Our goal is to provide you with very good care and we value your comments.     Depression / Suicide Risk    As you are discharged from this [facility] facility, it is important to learn how to keep safe from harming yourself.    Recognize the warning signs:  Abrupt changes in personality, positive or negative- including increase in energy   Giving away possessions  Change in eating patterns- significant weight changes-  positive or negative  Change in sleeping patterns- unable to sleep or sleeping all the time   Unwillingness or inability to communicate  Depression  Unusual sadness, discouragement and loneliness  Talk of wanting to die  Neglect of personal appearance   Rebelliousness- reckless behavior  Withdrawal from people/activities they love  Confusion- inability to concentrate     If you or a loved one observes any of these behaviors or has concerns about self-harm, here's what you can do:  Talk about it- your feelings and reasons for harming yourself  Remove any means that you might use to hurt yourself (examples: pills, rope, extension cords, firearm)  Get professional help from the community (Mental Health, Substance Abuse, psychological counseling)  Do not be alone:Call your Safe Contact- someone whom you trust who will be there for you.  Call your local CRISIS HOTLINE  609-3473 or [phone]  Call your local Children's Mobile Crisis Response Team [place] (930) 653-9441 or www.[employer]  Call the toll free National Suicide Prevention Hotlines   National Suicide Prevention Lifeline 191-157-WFSG (5191)  National [employer] Line Network 800-SUICIDE (931-1833)    I acknowledge receipt and understanding of these Home Care instructions.

## 2024-03-13 NOTE — PROGRESS NOTES
Medication history reviewed with PT at bedside    Med rec is complete per PT reporting    Allergies reviewed.     Patient denies any outpatient antibiotics in the last 30 days.     Patient is not taking anticoagulants.    Preferred pharmacy for this visit - [facility] on [address] ([phone])

## 2024-03-14 ENCOUNTER — TELEPHONE (OUTPATIENT)
Dept: OBGYN | Facility: CLINIC | Age: 64
End: 2024-03-14
Payer: COMMERCIAL

## 2024-03-14 ENCOUNTER — TELEPHONE (OUTPATIENT)
Dept: GYNECOLOGY | Facility: CLINIC | Age: 64
End: 2024-03-14
Payer: COMMERCIAL

## 2024-03-14 LAB — PATHOLOGY CONSULT NOTE: NORMAL

## 2024-03-14 ASSESSMENT — PAIN SCALES - GENERAL: PAIN_LEVEL: 0

## 2024-03-14 NOTE — TELEPHONE ENCOUNTER
3/14/2024 1051  Called pt to schedule voiding trial for 3/15/2024. Scheduled for 1100. Pt agreed and verbalized understanding.

## 2024-03-14 NOTE — OR NURSING
PACU note- Patient arrived from OR, breathing easily, alert and oriented. Surgical sites assessed, x7 abdominal lap sites, closed with surgical glue, open to air, no drainage observed. Rosa catheter in place and draining. Able to move all extremities, strong radial and pedal pulses, good capillary refill, denies numbness and tingling. Medicated for pain with good effect. Tolerating PO fluids without nausea. Called and updated family.

## 2024-03-14 NOTE — ANESTHESIA TIME REPORT
Anesthesia Start and Stop Event Times       Date Time Event    3/13/2024 1258 Ready for Procedure     1324 Anesthesia Start     1746 Anesthesia Stop          Responsible Staff  03/13/24      Name Role Begin End    Cody Boyer M.D. Anesth 1324 1744          Overtime Reason:  no overtime (within assigned shift)    Comments:

## 2024-03-14 NOTE — ANESTHESIA POSTPROCEDURE EVALUATION
Patient: Elaine Banks    Procedure Summary       Date: 03/13/24 Room / Location: [place] [address] / SURGERY [facility]    Anesthesia Start: 1324 Anesthesia Stop: 1746    Procedures:       ROBOTIC ASSISTED SUPRACERVICAL HYSTERECTOMY, BILATERAL SALPINGO OOPHORECTOMY (Pelvis)      SACROCOLPOPEXY, ROBOT-ASSISTED, USING DA MAAME XI (Pelvis)      POSTERIOR REPAIR / PERINEORRHAPHY (Vagina )      MID URETHERAL SLING (Urethra)      REPAIR, DEFECT, PARAVAGINAL, ROBOT-ASSISTED, LAPAROSCOPIC (Vagina )      CYSTOSURETHROCOPY (Urethra)      LYSIS, ADHESIONS (Pelvis) Diagnosis: (INCOMPLETE UTEROVAGINAL PROLAPSE, CYSTOCELE LATERAL RECTOCELE, STRESS URINARY INCONTINENCE)    Surgeons: Syed Sharma M.D. Responsible Provider: Cody Boyer M.D.    Anesthesia Type: general ASA Status: 2            Final Anesthesia Type: general  Last vitals  BP   Blood Pressure: 123/74    Temp   35.9 °C (96.7 °F)    Pulse   100   Resp   (!) 10    SpO2   96 %      Anesthesia Post Evaluation    Patient location during evaluation: PACU  Patient participation: complete - patient participated  Level of consciousness: awake and alert  Pain score: 0    Airway patency: patent  Anesthetic complications: no  Cardiovascular status: hemodynamically stable  Respiratory status: acceptable  Hydration status: euvolemic    PONV: none          No notable events documented.     Nurse Pain Score: 0 (NPRS)

## 2024-03-14 NOTE — TELEPHONE ENCOUNTER
Post-operative phone call    I called Ms. Banks and confirmed her identity. She is POD 1 s/p Robotic assisted laparoscopic supracervical hysterectomy, bilateral salpingo-oophorectomy. Robotic sacrocolpopexy. Robotic paravaginal repair. Retropubic midurethral sling. Posterior repair, perineorrhaphy.     She reports doing well, pain controlled with oxycodone as well as ibuprofen and tylenol, ambulating, tolerating regular diet, not passing gas or having bowel movement yet and is using Miralax. Patient was not able void post surgery and has kamara catheter in place. Denies nausea, vomiting, fever, chills, SOB, heavy vaginal bleeding.   All questions answered. Advised patient to reach out via [employer] or call the office at [phone] should she have any questions or concerns prior to f/u.     She will follow up as scheduled on 4/23/24 with Dr. Sharma, or earlier if any issues arise. Patient is scheduled for a void trial on 3/15/24 in the office.       FAHAD Rios, RNFA

## 2024-03-14 NOTE — OR NURSING
2024 Arrived from PACU AOX4, Pt's VSS; denies N/V; states pain is at tolerable level. Patient states she feels like she needs to urinate. Kamara back filled with 300 CC sterile water, kamara pulled and patient up to bathroom. Daughter in law Suyapa called to let know Elaine should be ready to discharge soon. She will be at [facility] shortly.    2130 D/c orders received. 16 F kamara placed because patient unable to void. Instructions given to patient and Suyapa and they verbalized understanding that she will need to contact MD in AM because kamara was placed. IV dc'd. Pt changed into clothing with assistance. Discharge reviewed, Pt and family verbalized understanding and questions answered. Patient states ready to d/c home.       2141 Pt dc'd in w/c with RN.

## 2024-03-15 ENCOUNTER — NON-PROVIDER VISIT (OUTPATIENT)
Dept: OBGYN | Facility: CLINIC | Age: 64
End: 2024-03-15
Payer: COMMERCIAL

## 2024-03-15 NOTE — PROGRESS NOTES
1100 Pt came in today for voiding trial as ordered by Dr Sharma.  Bladder back fill by gravity as ordered with 300 mL saline. Kamara catheter balloon deflated follow by removal of it, tip intact, pt tolerated well. Pt ambulated to bathroom, without assistance. Pt able to void 375 ml into the toilet hat.    Dr Sharma notified and pt able to keep kamara catheter out.   Pt asked when she could take a bath. Dr Sharma stated that she can take a shower today, but wait 6 weeks to take a bath. Informed pt and she verbalized understanding.  Informed pt that she should go to the bathroom every 2 hours while awake, even if she doesn't void, to retrain bladder. If she has fever/chills or unable to void, she should go to the ER on the weekend and call the office during the week.  Pt agreed and verbalized understanding.  Verified f/u appt with Dr Sharma, 4/23/2024 at 0845.

## 2024-04-22 NOTE — PROGRESS NOTES
Urogynecology & Reconstructive Pelvic Surgery - Follow Up    Elaine Banks MRN:[id number] :1960    Referred by: Taryn Cox MD    Reason for Visit:   Chief Complaint   Patient presents with    Other     Post Op          Subjective     History of Presenting Illness:    Elaine Banks is a 63 y.o. year old P3 with prolapse who presents for follow up 6 weeks s/p RaSCH/BSO/scpx/PVR/PNR/sling.     She has recovered well, no recurrent prolapse.  Emptying her bladder well, no new urinary incontinence.  She is happy with surgical outcomes    Initial HPI: She presents for the evaluation and management of prolapse.     She has 1.5 years of prolaspe symptoms, worsening, interferes with work and exercise.  This comes far outside the vagina    Also has frequent urinary incontinence throughout the day but she is unaware of what is happening, not associated with either urgency or stress leaks    She previously tried pessary therapy but these fell out.  She is interested in definitive surgical management.    Prior Abdominal/Pelvic surgery:   Gastric sleeve  3/13/2024: Robotic assisted laparoscopic supracervical hysterectomy, bilateral salpingo-oophorectomy, lysis of adhesions, sacrocolpopexy, paravaginal repair, retropubic sling, posterior repair/perineorrhaphy ([facility]ach[facility])     Prior treatment:   Kegels  Pessary     Fluid intake:   3-4 cups of water    Pelvic floor symptom review:     Bladder:   Voids per day: 8-10 Voids per night: 1-2      Urinary incontinence episodes per day: hard to quantify    Urge leakage:  None   Stress leakage: With Cough --> mild/stable   Continuous / insensible urine loss: Yes   Nocturnal enuresis: No    Leakage volume: Improved    Number of pads/day: 1-2    Bladder emptying: Complete   Voiding symptoms: Post-Void Dribble and Weak Stream--> improved   UTI in last 12 months: no   Other urologic history: no      Prolapse:     Prolapse symptoms: Dissolved     Bowel:    Constipation: Yes   Bowel  movements per day: every other day    Straining to empty bowels: No    Splinting to evacuate: No    Painful evacuation: No    Difficulty emptying rectum: sometimes   Incontinence to stool: once per week, staining   Blood in stool: No    Hemorrhoids: No         Sexual function:    Sexually active: No    Gender of partners: Male        Past medical and surgical history    Past obstetric history   Number of vaginal deliveries: 3   Number of  deliveries: 0   History of vacuum/forceps: Yes   History of obstetric anal sphincter injury: No     Past gynecological history:    Last menstrual period: No LMP recorded. Patient has had a hysterectomy.   History of abnormal uterine bleeding: No    History of fibroids: No    History of endometrial polyps:  No    History of endometriosis: No    History of cervical dysplasia: No    Last pap: 23 neg        Past medical history:  Past Medical History:   Diagnosis Date    Gynecological disorder Oct 2021    Prolapse uterus, bladder and vaginal rear wall    High cholesterol     Not sure of the exact date    Sleep apnea  or 2017    Have since had vsg weight loss surgery     Past surgical history:  Past Surgical History:   Procedure Laterality Date    NC LAPAROSCOPY, SURG, COLPOPEXY  3/13/2024    Procedure: SACROCOLPOPEXY, ROBOT-ASSISTED, USING DA MAAME XI;  Surgeon: Syed Sharma M.D.;  Location: SURGERY [facility];  Service: Gyn Robotic    NC POST COLPORRHAPHY,RECTUM/VAGINA  3/13/2024    Procedure: POSTERIOR REPAIR / PERINEORRHAPHY;  Surgeon: Syed Sharma M.D.;  Location: [facility];  Service: Gyn Robotic    NC PARAVAGINAL DEFECT REPAIR LAPAROSCOPIC APPRO*  3/13/2024    Procedure: REPAIR, DEFECT, PARAVAGINAL, ROBOT-ASSISTED, LAPAROSCOPIC;  Surgeon: Syed Sharma M.D.;  Location: SURGERY [facility];  Service: Gyn Robotic    NC CYSTOURETHROSCOPY  3/13/2024    Procedure: CYSTOSURETHROCOPY;  Surgeon: Syed Sharma M.D.;  Location: [facility];  Service:  Gyn Robotic    HYSTERECTOMY, SUPRACERVICAL, ROBOT-ASSISTED, USING DA VI  3/13/2024    Procedure: ROBOTIC ASSISTED SUPRACERVICAL HYSTERECTOMY, BILATERAL SALPINGO OOPHORECTOMY;  Surgeon: Syed Sharma M.D.;  Location: SURGERY [facility];  Service: Gyn Robotic    BLADDER SLING FEMALE  3/13/2024    Procedure: MID URETHERAL SLING;  Surgeon: Syed Sharma M.D.;  Location: SURGERY [facility];  Service: Gyn Robotic    LYSIS ADHESIONS GENERAL  3/13/2024    Procedure: LYSIS, ADHESIONS;  Surgeon: Syed Sharma M.D.;  Location: SURGERY [facility];  Service: Gyn Robotic    OTHER ABDOMINAL SURGERY  12/2020    gastric sleeve surgery    BUNIONECTOMY Right 2018    LIPOMA EXCISION Left 2016    OTHER ORTHOPEDIC SURGERY  2016     Medications:has a current medication list which includes the following prescription(s): multivitamin, omega-3 fatty acids, calcium citrate, biotin, vitamin d, estradiol, and acetaminophen.  Allergies:Food and Nsaids  Family history:  Family History   Problem Relation Age of Onset    Breast Cancer Mother     Cancer Mother         mets to brain and bones    Heart Disease Father         heart attack - poor diet and amphetamine use    Diabetes Maternal Uncle     Diabetes Maternal Grandmother     Hypertension Sister     Hypertension Brother      Social history: reports that she has never smoked. She has never used smokeless tobacco. She reports that she does not currently use alcohol. She reports that she does not currently use drugs.    Review of systems: A full review of systems was performed, and negative with the exception of want is noted above in the HPI.        Objective        /81 (BP Location: Right arm, Patient Position: Sitting, BP Cuff Size: Adult)   Pulse 76   Wt 184 lb   BMI 30.62 kg/m²     Physical Exam  Vitals reviewed. Exam conducted with a chaperone present (MA - see notes.).   Constitutional:       Appearance: Normal appearance.   HENT:      Head: Normocephalic.      Mouth/Throat:       Mouth: Mucous membranes are moist.   Cardiovascular:      Rate and Rhythm: Normal rate.   Pulmonary:      Effort: Pulmonary effort is normal.   Abdominal:      Palpations: Abdomen is soft. There is no mass.      Tenderness: There is no abdominal tenderness.   Skin:     General: Skin is warm and dry.   Neurological:      Mental Status: She is alert.   Psychiatric:         Mood and Affect: Mood normal.     Laparoscopic incisions well healed    Genitourinary: Prior   Vulva: WNL   Bulbocavernosus reflex: Intact   Anal wink reflex: Intact   Perineal sensation: WNL   Urethra: WNL -no suture or mesh exposure noted   Vagina: Atrophic-well-healed without suture or mesh exposure is noted   Atrophy: Moderate   Cough stress test: Negative    Pelvic floor:    POP-Q: Post op Aa -2 / Ba -2 / TVL 10.5 / C -8 / D -2 / Ap -2.5 / Bp -2.5 / GH 3.5 / PB 3        Procedure Performed:       Diagnostic test and records review:      Post-void residual: 1 mL, performed by Bladder Scanner    Labs: n/a    Radiology: n/a    Procedural:     Urodynamics  Filling phase: Normal compliance and capacity.  Stress incontinence demonstrated towards capacity.  No uninhibited detrusor contractions.    Voiding phase: Complete emptying on pressure flow with detrusor contraction, with continuous fluctuant pattern, likely secondary to prolapse    Documentation reviewed: Prior EMR Records    Outside records reviewed: 9 pages           Assessment & Plan     Elaine Banks is a 63 y.o. year old P3 with stage III uterovaginal prolapse and urinary incontinence.     Uterovaginal prolapse, s/p robotic hysterectomy/sacrocolpopexy/paravaginal repair/sling  -Well-healed, no recurrent prolapse, may return to all normal activities.  All questions answered        OAB (overactive bladder)  Incontinence without sensory awareness  Improved after prolapse repair and sling, no new incontinence, continue to observe    6. Genitourinary syndrome of menopause  She has vaginal  atrophy on examination. Reviewed risks, benefits, and indications for vaginal estrogen therapy.  Resume vaginal estrogen therapy twice weekly.                  Syed Sharma MD, FACOG  Urogynecology and Reconstructive Pelvic Surgery  Department of Obstetrics and Gynecology  [facility]        This medical record contains text that has been entered with the assistance of computer voice recognition and dictation software.  Therefore, it may contain unintended errors in text, spelling, punctuation, or grammar

## 2024-04-23 ENCOUNTER — GYNECOLOGY VISIT (OUTPATIENT)
Dept: GYNECOLOGY | Facility: CLINIC | Age: 64
End: 2024-04-23
Payer: COMMERCIAL

## 2024-04-23 VITALS
DIASTOLIC BLOOD PRESSURE: 81 MMHG | BODY MASS INDEX: 30.62 KG/M2 | SYSTOLIC BLOOD PRESSURE: 122 MMHG | HEART RATE: 76 BPM | WEIGHT: 184 LBS

## 2024-04-23 DIAGNOSIS — N95.2 ATROPHIC VAGINITIS: ICD-10-CM

## 2024-04-23 DIAGNOSIS — Z98.890 POSTOPERATIVE STATE: ICD-10-CM

## 2024-04-23 PROBLEM — N81.4 UTERINE PROLAPSE: Status: RESOLVED | Noted: 2022-01-31 | Resolved: 2024-04-23

## 2024-04-23 PROBLEM — N81.12 CYSTOCELE, LATERAL: Status: RESOLVED | Noted: 2023-11-07 | Resolved: 2024-04-23

## 2024-04-23 PROBLEM — N81.2 INCOMPLETE UTEROVAGINAL PROLAPSE: Status: RESOLVED | Noted: 2023-11-07 | Resolved: 2024-04-23

## 2024-04-23 PROBLEM — N81.6 RECTOCELE: Status: RESOLVED | Noted: 2023-11-07 | Resolved: 2024-04-23

## 2024-04-23 PROCEDURE — 99024 POSTOP FOLLOW-UP VISIT: CPT | Performed by: STUDENT IN AN ORGANIZED HEALTH CARE EDUCATION/TRAINING PROGRAM

## 2024-04-23 ASSESSMENT — FIBROSIS 4 INDEX: FIB4 SCORE: 1.28

## 2024-04-24 RX ORDER — ESTRADIOL 0.1 MG/G
CREAM VAGINAL
Qty: 1 EACH | Refills: 6 | Status: SHIPPED | OUTPATIENT
Start: 2024-04-24

## (undated) DEVICE — FORCEPS MARYLAND BIPOLAR (14UN/EA)

## (undated) DEVICE — TROCAR LAPSCP 100MM 12MM NTHRD - (6/BX)

## (undated) DEVICE — GLOVE SZ 6 BIOGEL PI MICRO - PF LF (50PR/BX 4BX/CA)

## (undated) DEVICE — SHEARS MONOPOLAR CURVED  DA VINCI 10X'S REUSABLE

## (undated) DEVICE — SET SUCTION/IRRIGATION WITH DISPOSABLE TIP (6/CA )PART #0250-070-520 IS A SUB

## (undated) DEVICE — DRAPE ARM  BOX OF 20

## (undated) DEVICE — SPONGE XRAY 8X4 STERL. 12PL - (10EA/TY 80TY/CA)

## (undated) DEVICE — TRAY SRGPRP PVP IOD WT PRP - (20/CA)

## (undated) DEVICE — DRIVER LARGE SUTURECUT NEEDLE (15UN/EA)

## (undated) DEVICE — SUTURE 2-0 PDS PLUS SH 27 (36EA/BX)"

## (undated) DEVICE — SUTURE 2-0 20CM STRATAFIX SPIRAL SH NEEDLE (12/BX)

## (undated) DEVICE — LACTATED RINGERS INJ 1000 ML - (14EA/CA 60CA/PF)

## (undated) DEVICE — PAD OR TABLE DA VINCI 2IN X 20IN X 72IN - (12EA/CA)

## (undated) DEVICE — TOWELS CLOTH SURGICAL - (4/PK 20PK/CA)

## (undated) DEVICE — COVER TIP ENDOWRIST HOT SHEAR - (10EA/BX) DA VINCI

## (undated) DEVICE — SLEEVE, VASO, THIGH, MED

## (undated) DEVICE — TRAY FOLEY CATHETER STATLOCK 16FR SURESTEP  (10EA/CA)

## (undated) DEVICE — DRAPE UNDER BUTTOCKS FLUID - (20/CA)

## (undated) DEVICE — SUTURE GOR-TEX CV-2 TH-26 (2NO2A=12PK/BX)  (2NO2B=36PK/BX)

## (undated) DEVICE — SUCTION INSTRUMENT YANKAUER BULBOUS TIP W/O VENT (50EA/CA)

## (undated) DEVICE — GOWN SURGEONS X-LARGE - DISP. (30/CA)

## (undated) DEVICE — TIP SACROCOLPOPEXY

## (undated) DEVICE — PAD LAP STERILE 18 X 18 - (5/PK 40PK/CA)

## (undated) DEVICE — GOWN WARMING STANDARD FLEX - (30/CA)

## (undated) DEVICE — UTERINE MANIP V-CARE STANDARD DAVINCI (8EA/CA)

## (undated) DEVICE — COVER LIGHT HANDLE ALC PLUS DISP (18EA/BX)

## (undated) DEVICE — PENCIL ELECTSURG 10FT BTN SWH - (50/CA)

## (undated) DEVICE — SET LEADWIRE 5 LEAD BEDSIDE DISPOSABLE ECG (1SET OF 5/EA)

## (undated) DEVICE — SUTURE 2-0 VICRYL PLUS SH - 27 INCH (36/BX)

## (undated) DEVICE — SENSOR OXIMETER ADULT SPO2 RD SET (20EA/BX)

## (undated) DEVICE — CANISTER SUCTION 3000ML MECHANICAL FILTER AUTO SHUTOFF MEDI-VAC NONSTERILE LF DISP  (40EA/CA)

## (undated) DEVICE — SUTURE 4-0 MONOCRYL PLUS PS-2 - 27 INCH (36/BX)

## (undated) DEVICE — SCRUB SOLUTION EXIDINE 4% 40Z - 48/CS CHLORAHEXADINE GLUCONATE

## (undated) DEVICE — GLOVE BIOGEL INDICATOR SZ 7SURGICAL PF LTX - (50/BX 4BX/CA)

## (undated) DEVICE — TUBE CONNECT SUCTION CLEAR 120 X 1/4" (50EA/CA)"

## (undated) DEVICE — JELLY SURGILUBE STERILE TUBE 4.25 OZ (1/EA)

## (undated) DEVICE — DERMABOND ADVANCED - (12EA/BX)

## (undated) DEVICE — SUTURE 0 VICRYL PLUS CT-2 - 27 INCH (36/BX)

## (undated) DEVICE — PACK TRENGUARD 450 PROCEDURE (12EA/CA)

## (undated) DEVICE — FORCEPS PROGRASP (18UN/EA)

## (undated) DEVICE — SYRINGE 10 ML CONTROL LL (25EA/BX 4BX/CA)

## (undated) DEVICE — DRAPE MAYO STAND - (30/CA)

## (undated) DEVICE — KIT SURGIFLO W/OUT THROMBIN - (6EA/CA)

## (undated) DEVICE — SODIUM CHL IRRIGATION 0.9% 1000ML (12EA/CA)

## (undated) DEVICE — BRIEF STRETCH MATERNITY M/L - FITS 20-60IN (5EA/BG 20BG/CA)

## (undated) DEVICE — SUTURE 2-0 PROLENE SH (36PK/BX)

## (undated) DEVICE — TRAY CATHETER FOLEY URINE METER W/STATLOCK 350ML (10EA/CA)

## (undated) DEVICE — SUTURE GENERAL

## (undated) DEVICE — ELECTRODE DUAL RETURN W/ CORD - (50/PK)

## (undated) DEVICE — ARMREST CRADLE FOAM - (2PR/PK 12PR/CA)

## (undated) DEVICE — BLADE SURGICAL #10 - (50/BX)

## (undated) DEVICE — SYRINGE ASEPTO - (50EA/CA

## (undated) DEVICE — DRIVER LARGE NEEDLE (15UN/EA)

## (undated) DEVICE — PACK GYN DAVINCI (1EA/CA)

## (undated) DEVICE — SYSTEM CLEARIFY VISUALIZATION (10EA/PK)

## (undated) DEVICE — DRAPE COLUMN  BOX OF 20

## (undated) DEVICE — TUBE E-T HI-LO CUFF 7.0MM (10EA/PK)

## (undated) DEVICE — NEEDLE NON SAFETY HYPO 22 GA X 1 1/2 IN (100/BX)

## (undated) DEVICE — GLOVE BIOGEL PI INDICATOR SZ 6.5 SURGICAL PF LF - (50/BX 4BX/CA)

## (undated) DEVICE — GLOVE BIOGEL SZ 7 SURGICAL PF LTX - (50PR/BX 4BX/CA)

## (undated) DEVICE — BAG RETRIEVAL 5MM (10EA/BX)

## (undated) DEVICE — OBTURATOR BLADELESS LONG 8MM (6EA/BX)

## (undated) DEVICE — SET EXTENSION WITH 2 PORTS (48EA/CA) ***PART #2C8610 IS A SUBSTITUTE*****

## (undated) DEVICE — SUCTION INSTRUMENT YANKAUER OPEN TIP W/O VENT (50EA/CA)

## (undated) DEVICE — GLOVE BIOGEL PI INDICATOR SZ 7.0 SURGICAL PF LF - (50/BX 4BX/CA)

## (undated) DEVICE — SUTURE 2-0 STRATAFIX SPIRAL PDS SH (12EA/BX)

## (undated) DEVICE — RINGDISP RETRACTOR LONESTAR

## (undated) DEVICE — NEEDLE INSFL 120MM 14GA VRRS - (20/BX)

## (undated) DEVICE — GLOVE BIOGEL SZ 6.5 SURGICAL PF LTX (50PR/BX 4BX/CA)

## (undated) DEVICE — OBTURATOR BLADELESS STANDARD 8MM (6EA/BX)

## (undated) DEVICE — Device

## (undated) DEVICE — SEAL 5MM-8MM UNIVERSAL  BOX OF 10

## (undated) DEVICE — GLOVE BIOGEL INDICATOR SZ 7.5 SURGICAL PF LTX - (50PR/BX 4BX/CA)

## (undated) DEVICE — TUBING CLEARLINK DUO-VENT - C-FLO (48EA/CA)

## (undated) DEVICE — SLEEVE VASO CALF MED - (10PR/CA)

## (undated) DEVICE — SUTURE 0 VICRYL PLUS UR-6 - 27 INCH (36/BX)